# Patient Record
Sex: FEMALE | Race: WHITE | NOT HISPANIC OR LATINO | Employment: UNEMPLOYED | URBAN - METROPOLITAN AREA
[De-identification: names, ages, dates, MRNs, and addresses within clinical notes are randomized per-mention and may not be internally consistent; named-entity substitution may affect disease eponyms.]

---

## 2017-11-16 ENCOUNTER — OFFICE VISIT (OUTPATIENT)
Dept: URGENT CARE | Facility: CLINIC | Age: 22
End: 2017-11-16
Payer: COMMERCIAL

## 2017-11-16 VITALS
WEIGHT: 171 LBS | SYSTOLIC BLOOD PRESSURE: 112 MMHG | HEIGHT: 63 IN | BODY MASS INDEX: 30.3 KG/M2 | OXYGEN SATURATION: 98 % | TEMPERATURE: 98.3 F | DIASTOLIC BLOOD PRESSURE: 72 MMHG | HEART RATE: 103 BPM

## 2017-11-16 DIAGNOSIS — R10.11 ACUTE BILATERAL UPPER ABDOMINAL PAIN: ICD-10-CM

## 2017-11-16 DIAGNOSIS — K21.9 GASTROESOPHAGEAL REFLUX DISEASE, ESOPHAGITIS PRESENCE NOT SPECIFIED: ICD-10-CM

## 2017-11-16 DIAGNOSIS — Z3A.24 24 WEEKS GESTATION OF PREGNANCY: ICD-10-CM

## 2017-11-16 DIAGNOSIS — R10.12 ACUTE BILATERAL UPPER ABDOMINAL PAIN: ICD-10-CM

## 2017-11-16 DIAGNOSIS — R11.0 NAUSEA: ICD-10-CM

## 2017-11-16 LAB
APPEARANCE UR: ABNORMAL
BILIRUB UR STRIP-MCNC: ABNORMAL MG/DL
COLOR UR AUTO: YELLOW
FLUAV+FLUBV AG SPEC QL IA: NORMAL
GLUCOSE UR STRIP.AUTO-MCNC: ABNORMAL MG/DL
INT CON NEG: NEGATIVE
INT CON POS: POSITIVE
KETONES UR STRIP.AUTO-MCNC: ABNORMAL MG/DL
LEUKOCYTE ESTERASE UR QL STRIP.AUTO: ABNORMAL
NITRITE UR QL STRIP.AUTO: ABNORMAL
PH UR STRIP.AUTO: 8.5 [PH] (ref 5–8)
PROT UR QL STRIP: 30 MG/DL
RBC UR QL AUTO: ABNORMAL
SP GR UR STRIP.AUTO: 1
UROBILINOGEN UR STRIP-MCNC: ABNORMAL MG/DL

## 2017-11-16 PROCEDURE — 87804 INFLUENZA ASSAY W/OPTIC: CPT | Performed by: NURSE PRACTITIONER

## 2017-11-16 PROCEDURE — 99204 OFFICE O/P NEW MOD 45 MIN: CPT | Performed by: NURSE PRACTITIONER

## 2017-11-16 PROCEDURE — 81002 URINALYSIS NONAUTO W/O SCOPE: CPT | Performed by: NURSE PRACTITIONER

## 2017-11-16 RX ORDER — ACETAMINOPHEN 160 MG/5ML
15 SUSPENSION ORAL EVERY 4 HOURS PRN
Status: ON HOLD | COMMUNITY
End: 2018-03-06

## 2017-11-16 NOTE — PROGRESS NOTES
"Chief Complaint   Patient presents with   • GI Problem       HISTORY OF PRESENT ILLNESS: Patient is a 22 y.o. female who presents to urgent care today with complaints of nausea and upper abdominal pain. She is currently 24 weeks pregnant. States that since yesterday she has had intermittent upper abdominal cramping, the pain awoke her from sleep last night. States she has a history of acid reflux and took a tums with some improvement. She is concerned because of this pain along with \"contractions\" over the past several days. She has not noticed changes in fetal movement. Denies fever, chills, urinary complaints, flank pain, sore throat, diarrhea, or vomiting. She called her OBGYN today and they instructed her to come to urgent care today to get tested for the \"flu\".       There are no active problems to display for this patient.      Allergies:Patient has no allergy information on record.    Current Outpatient Prescriptions Ordered in [facility]   Medication Sig Dispense Refill   • acetaminophen (TYLENOL) 160 MG/5ML Suspension Take 15 mg/kg by mouth every four hours as needed.     • Prenatal Vit-Fe Fumarate-FA (PRENATAL VITAMIN PO) Take  by mouth.       No current Epic-ordered facility-administered medications on file.        History reviewed. No pertinent past medical history.    Social History   Substance Use Topics   • Smoking status: Former Smoker     Quit date: 5/16/2017   • Smokeless tobacco: Never Used   • Alcohol use Not on file       No family status information on file.   History reviewed. No pertinent family history.    ROS:  Review of Systems   Constitutional: Negative for fever, chills, weight loss, malaise, and fatigue.   HENT: Negative for ear pain, nosebleeds, congestion, sore throat and neck pain.    Eyes: Negative for vision changes.   Neuro: Negative for headache, sensory changes, weakness, seizure, LOC.   Cardiovascular: Negative for chest pain, palpitations, orthopnea and leg swelling.   Respiratory: " "Negative for cough, sputum production, shortness of breath and wheezing.   Gastrointestinal: Positive for abdominal pain, nausea. Negative for vomiting or diarrhea.   Genitourinary: Negative for dysuria, urgency and frequency.  Musculoskeletal: Negative for falls, neck pain, back pain, joint pain, myalgias.   Skin: Negative for rash, diaphoresis.     Exam:  Blood pressure 112/72, pulse (!) 103, temperature 36.8 °C (98.3 °F), height 1.6 m (5' 3\"), weight 77.6 kg (171 lb), SpO2 98 %, not currently breastfeeding.  General: well-nourished, well-developed female in NAD  Head: normocephalic, atraumatic  Eyes: PERRLA, no conjunctival injection, acuity grossly intact, lids normal.  Ears: normal shape and symmetry, gross auditory acuity is intact.  Nose: symmetrical without tenderness, no discharge.  Mouth/Throat: reasonable hygiene, no erythema, exudates or tonsillar enlargement.  Neck: no masses, range of motion within normal limits, no tracheal deviation. No obvious thyroid enlargement.   Lymph: no cervical adenopathy. No supraclavicular adenopathy.   Neuro: alert and oriented. Cranial nerves 1-12 grossly intact. No sensory deficit.   Cardiovascular: tachycardic rate and regular rhythm. No edema.  Pulmonary: no distress. Chest is symmetrical with respiration, no wheezes, crackles, or rhonchi.   Abdomen: soft, epigastric tenderness, no guarding, no hepatosplenomegaly.  Musculoskeletal: no clubbing, appropriate muscle tone, gait is stable.  Skin: warm, dry, intact, no clubbing, no cyanosis, no rashes.   Psych: appropriate mood, affect, judgement.         Assessment/Plan:  1. Acute bilateral upper abdominal pain     2. 24 weeks gestation of pregnancy     3. Nausea  POCT Influenza A/B    POCT Urinalysis   4. Gastroesophageal reflux disease, esophagitis presence not specified           POC flu negative    Lab Results   Component Value Date/Time    POCCOLOR yellow 11/16/2017 10:17 AM    POCAPPEAR Cloudy 11/16/2017 10:17 AM    " "POCLEUKEST tr 11/16/2017 10:17 AM    POCNITRITE neg 11/16/2017 10:17 AM    POCUROBILIGE neg 11/16/2017 10:17 AM    POCPROTEIN 30 11/16/2017 10:17 AM    POCURPH 8.5 (A) 11/16/2017 10:17 AM    POCBLOOD neg 11/16/2017 10:17 AM    POCSPGRV 1.005 11/16/2017 10:17 AM    POCKETONES neg 11/16/2017 10:17 AM    POCBILIRUBIN neg 11/16/2017 10:17 AM    POCGLUCUA neg 11/16/2017 10:17 AM        The patient is a 22-year-old female who is currently 24 weeks pregnant who presents today with bilateral, intermittent, upper abdominal pain, described as cramping, with associated nausea for the past 2 days. I do not suspect flu today and flu swab negative in office. Her urine dip is negative for obvious infectious process. Her pain may be related to reflux as Tums was beneficial relieving symptoms. Nevertheless, I'm concerned about her symptoms along with recent \"contractions\" and feel she needs further fetal monitoring and evaluation. The patient has been instructed to call and follow up with her OB/GYN today. If she is unable to do so, she's been encouraged to go to the labor and delivery floor for further evaluation and care. The patient states agreement and understanding.          Please note that this dictation was created using voice recognition software. I have made every reasonable attempt to correct obvious errors, but I expect that there are errors of grammar and possibly content that I did not discover before finalizing the note.      MELVIN Muñoz.     "

## 2017-11-16 NOTE — LETTER
November 16, 2017         Patient: Carrie Adame   YOB: 1995   Date of Visit: 11/16/2017           To Whom it May Concern:    Carrie Adame was seen in my clinic on 11/16/2017. She may be excused from work today and tomorrow.     If you have any questions or concerns, please don't hesitate to call.        Sincerely,           MELVIN Muñoz.  Electronically Signed

## 2017-11-21 ENCOUNTER — HOSPITAL ENCOUNTER (OUTPATIENT)
Facility: MEDICAL CENTER | Age: 22
End: 2017-11-21
Attending: OBSTETRICS & GYNECOLOGY | Admitting: OBSTETRICS & GYNECOLOGY
Payer: COMMERCIAL

## 2017-11-21 ENCOUNTER — OFFICE VISIT (OUTPATIENT)
Dept: URGENT CARE | Facility: CLINIC | Age: 22
End: 2017-11-21
Payer: COMMERCIAL

## 2017-11-21 VITALS
TEMPERATURE: 97.5 F | OXYGEN SATURATION: 97 % | HEIGHT: 63 IN | WEIGHT: 174 LBS | SYSTOLIC BLOOD PRESSURE: 112 MMHG | RESPIRATION RATE: 20 BRPM | DIASTOLIC BLOOD PRESSURE: 70 MMHG | BODY MASS INDEX: 30.83 KG/M2 | HEART RATE: 138 BPM

## 2017-11-21 VITALS — HEART RATE: 115 BPM | SYSTOLIC BLOOD PRESSURE: 126 MMHG | TEMPERATURE: 97.8 F | DIASTOLIC BLOOD PRESSURE: 66 MMHG

## 2017-11-21 DIAGNOSIS — R11.10 VOMITING, INTRACTABILITY OF VOMITING NOT SPECIFIED, PRESENCE OF NAUSEA NOT SPECIFIED, UNSPECIFIED VOMITING TYPE: ICD-10-CM

## 2017-11-21 DIAGNOSIS — Z3A.25 25 WEEKS GESTATION OF PREGNANCY: ICD-10-CM

## 2017-11-21 LAB
ALBUMIN SERPL BCP-MCNC: 3.6 G/DL (ref 3.2–4.9)
ALBUMIN/GLOB SERPL: 1.4 G/DL
ALP SERPL-CCNC: 70 U/L (ref 30–99)
ALT SERPL-CCNC: 37 U/L (ref 2–50)
ANION GAP SERPL CALC-SCNC: 10 MMOL/L (ref 0–11.9)
APPEARANCE UR: CLEAR
AST SERPL-CCNC: 36 U/L (ref 12–45)
BASOPHILS # BLD AUTO: 0.3 % (ref 0–1.8)
BASOPHILS # BLD: 0.02 K/UL (ref 0–0.12)
BILIRUB SERPL-MCNC: 1.1 MG/DL (ref 0.1–1.5)
BILIRUB UR STRIP-MCNC: NEGATIVE MG/DL
BUN SERPL-MCNC: 5 MG/DL (ref 8–22)
CALCIUM SERPL-MCNC: 9.3 MG/DL (ref 8.5–10.5)
CHLORIDE SERPL-SCNC: 107 MMOL/L (ref 96–112)
CO2 SERPL-SCNC: 19 MMOL/L (ref 20–33)
COLOR UR AUTO: NORMAL
CREAT SERPL-MCNC: 0.4 MG/DL (ref 0.5–1.4)
EOSINOPHIL # BLD AUTO: 0.03 K/UL (ref 0–0.51)
EOSINOPHIL NFR BLD: 0.4 % (ref 0–6.9)
ERYTHROCYTE [DISTWIDTH] IN BLOOD BY AUTOMATED COUNT: 40.3 FL (ref 35.9–50)
FLUAV RNA SPEC QL NAA+PROBE: NEGATIVE
FLUBV RNA SPEC QL NAA+PROBE: NEGATIVE
GFR SERPL CREATININE-BSD FRML MDRD: >60 ML/MIN/1.73 M 2
GLOBULIN SER CALC-MCNC: 2.6 G/DL (ref 1.9–3.5)
GLUCOSE BLD-MCNC: 106 MG/DL (ref 70–100)
GLUCOSE SERPL-MCNC: 84 MG/DL (ref 65–99)
GLUCOSE UR STRIP.AUTO-MCNC: NEGATIVE MG/DL
HCT VFR BLD AUTO: 31.9 % (ref 37–47)
HGB BLD-MCNC: 10.6 G/DL (ref 12–16)
IMM GRANULOCYTES # BLD AUTO: 0.14 K/UL (ref 0–0.11)
IMM GRANULOCYTES NFR BLD AUTO: 1.8 % (ref 0–0.9)
KETONES UR STRIP.AUTO-MCNC: 40 MG/DL
LEUKOCYTE ESTERASE UR QL STRIP.AUTO: NEGATIVE
LYMPHOCYTES # BLD AUTO: 1.07 K/UL (ref 1–4.8)
LYMPHOCYTES NFR BLD: 14 % (ref 22–41)
MCH RBC QN AUTO: 28.2 PG (ref 27–33)
MCHC RBC AUTO-ENTMCNC: 33.2 G/DL (ref 33.6–35)
MCV RBC AUTO: 84.8 FL (ref 81.4–97.8)
MONOCYTES # BLD AUTO: 0.49 K/UL (ref 0–0.85)
MONOCYTES NFR BLD AUTO: 6.4 % (ref 0–13.4)
NEUTROPHILS # BLD AUTO: 5.92 K/UL (ref 2–7.15)
NEUTROPHILS NFR BLD: 77.1 % (ref 44–72)
NITRITE UR QL STRIP.AUTO: NEGATIVE
NRBC # BLD AUTO: 0 K/UL
NRBC BLD AUTO-RTO: 0 /100 WBC
PH UR STRIP.AUTO: 6.5 [PH] (ref 5–8)
PLATELET # BLD AUTO: 228 K/UL (ref 164–446)
PMV BLD AUTO: 10 FL (ref 9–12.9)
POTASSIUM SERPL-SCNC: 3.4 MMOL/L (ref 3.6–5.5)
PROT SERPL-MCNC: 6.2 G/DL (ref 6–8.2)
PROT UR QL STRIP: NORMAL MG/DL
RBC # BLD AUTO: 3.76 M/UL (ref 4.2–5.4)
RBC UR QL AUTO: NEGATIVE
SODIUM SERPL-SCNC: 136 MMOL/L (ref 135–145)
SP GR UR STRIP.AUTO: 1.01
UROBILINOGEN UR STRIP-MCNC: NEGATIVE MG/DL
WBC # BLD AUTO: 7.7 K/UL (ref 4.8–10.8)

## 2017-11-21 PROCEDURE — 87502 INFLUENZA DNA AMP PROBE: CPT

## 2017-11-21 PROCEDURE — 81002 URINALYSIS NONAUTO W/O SCOPE: CPT

## 2017-11-21 PROCEDURE — 82962 GLUCOSE BLOOD TEST: CPT | Performed by: NURSE PRACTITIONER

## 2017-11-21 PROCEDURE — 80053 COMPREHEN METABOLIC PANEL: CPT

## 2017-11-21 PROCEDURE — 81002 URINALYSIS NONAUTO W/O SCOPE: CPT | Performed by: NURSE PRACTITIONER

## 2017-11-21 PROCEDURE — 36415 COLL VENOUS BLD VENIPUNCTURE: CPT

## 2017-11-21 PROCEDURE — 85025 COMPLETE CBC W/AUTO DIFF WBC: CPT

## 2017-11-21 PROCEDURE — 99213 OFFICE O/P EST LOW 20 MIN: CPT | Performed by: NURSE PRACTITIONER

## 2017-11-21 PROCEDURE — 87086 URINE CULTURE/COLONY COUNT: CPT

## 2017-11-21 ASSESSMENT — ENCOUNTER SYMPTOMS
SORE THROAT: 0
FEVER: 0
VOMITING: 1
CHILLS: 0
ABDOMINAL PAIN: 1
HEADACHES: 0
NAUSEA: 1
COUGH: 0
DIZZINESS: 1
MYALGIAS: 0

## 2017-11-21 NOTE — LETTER
November 21, 2017         Patient: Carrie Adame   YOB: 1995   Date of Visit: 11/21/2017           To Whom it May Concern:    Carrie Adame was seen in my clinic on 11/21/2017. Please excuse her for today 11/21/17 and tomorrow 11/22/17.    If you have any questions or concerns, please don't hesitate to call.        Sincerely,           Collins Jacob, A.P.N.  Electronically Signed

## 2017-11-22 LAB
APPEARANCE UR: CLEAR
COLOR UR AUTO: YELLOW
GLUCOSE UR QL STRIP.AUTO: NEGATIVE MG/DL
KETONES UR QL STRIP.AUTO: NEGATIVE MG/DL
LEUKOCYTE ESTERASE UR QL STRIP.AUTO: NEGATIVE
NITRITE UR QL STRIP.AUTO: NEGATIVE
PH UR STRIP.AUTO: 7 [PH]
PROT UR QL STRIP: NEGATIVE MG/DL
RBC UR QL AUTO: NEGATIVE
SP GR UR: <=1.005

## 2017-11-22 NOTE — PROGRESS NOTES
Subjective:      Carrie Adame is a 22 y.o. female who presents with Food Poisoning (25 weeks pregnant); Emesis; and Dizziness            HPI This is a new problem. 22 year old female with one day history of vomiting. She has had 3 episodes during night. She had some soup today which she has kept down. Denies fever, chills, diarrhea or headache. She is noted to be quite tachycardic here in clinic. She states epigastric pain as well with the vomiting. She was seen here 5 days ago with similar symptoms and advised to seek appt with OB. She has called them but they at this time are unconcerned. She denies vaginal bleeding, contractions. +fetal movement.  Patient has no known allergies.  Current Outpatient Prescriptions on File Prior to Visit   Medication Sig Dispense Refill   • acetaminophen (TYLENOL) 160 MG/5ML Suspension Take 15 mg/kg by mouth every four hours as needed.     • Prenatal Vit-Fe Fumarate-FA (PRENATAL VITAMIN PO) Take  by mouth.       No current facility-administered medications on file prior to visit.      Social History     Social History   • Marital status: Single     Spouse name: N/A   • Number of children: N/A   • Years of education: N/A     Occupational History   • Not on file.     Social History Main Topics   • Smoking status: Former Smoker     Quit date: 5/16/2017   • Smokeless tobacco: Never Used   • Alcohol use Not on file   • Drug use: Unknown   • Sexual activity: Not on file     Other Topics Concern   • Not on file     Social History Narrative   • No narrative on file     family history is not on file.      Review of Systems   Constitutional: Negative for chills, fever and malaise/fatigue.   HENT: Negative for congestion and sore throat.    Respiratory: Negative for cough.    Gastrointestinal: Positive for abdominal pain, nausea and vomiting.   Genitourinary: Negative.    Musculoskeletal: Negative for myalgias.   Neurological: Positive for dizziness. Negative for headaches.  "  Endo/Heme/Allergies: Negative.           Objective:     /70   Pulse (!) 138   Temp 36.4 °C (97.5 °F)   Resp 20   Ht 1.6 m (5' 3\")   Wt 78.9 kg (174 lb)   SpO2 97%   BMI 30.82 kg/m²      Physical Exam   Constitutional: She is oriented to person, place, and time. She appears well-developed and well-nourished. No distress.   Cardiovascular: Regular rhythm and normal heart sounds.  Tachycardia present.    No murmur heard.  Pulmonary/Chest: Effort normal and breath sounds normal. No respiratory distress.   Abdominal: Soft. Bowel sounds are normal. There is no tenderness. There is no CVA tenderness.   Musculoskeletal: Normal range of motion.   Moves all 4 extremities normally   Neurological: She is alert and oriented to person, place, and time.   Skin: Skin is warm and dry.   Psychiatric: She has a normal mood and affect. Her behavior is normal. Thought content normal.   Nursing note and vitals reviewed.              Assessment/Plan:     1. Vomiting, intractability of vomiting not specified, presence of nausea not specified, unspecified vomiting type  POCT Urinalysis    POCT Glucose   2. 25 weeks gestation of pregnancy       Moderate ketone/tachy at 138  She is advised to seek care at labor and delivery for further evaluation tonite.  Work note.  "

## 2017-11-22 NOTE — PROGRESS NOTES
EDC- 3/3/18, EGA- 25.3    - Pt arrived to L&D after being seen at urgent care around 1700.  Pt states she had nausea/vomiting last night (pt reports 3 episodes of emesis last night, none today) after eating some bad yogurt.  Pt also reports several people at work had nausea/vomiting so she is unsure if she has food poisoning or a virus.  Pt reports +FM, denies LOF or VB, reports slight cramping which she states has been normal for her during this pregnancy.  EFM/TOCO applied, VSS.  Urine sample obtained for POC UA with results negative for ketones, SG <=1.005.  Pt states she drank 3 large bottles of water this afternoon and has been able to keep food down, denies any nausea at this time.    -  Report to Dr. Cross via phone.  Orders for labs received.    - Report to Dr. Cross regarding lab results and FHR tracing reviewed.  Orders to send urine for culture and discharge pt home with instruction to stay hydrated, eat potassium rich foods, and return if nausea/vomiting returns.  Discussed hydration, potassium level and potassium rich foods,  labor precautions, kick counts, and reasons to return to L&D with pt and FOB, verbalize understanding.    - Pt discharged home ambulatory in stable condition with FOB at side.

## 2017-11-23 LAB
BACTERIA UR CULT: NORMAL
SIGNIFICANT IND 70042: NORMAL
SITE SITE: NORMAL
SOURCE SOURCE: NORMAL

## 2017-12-29 ENCOUNTER — HOSPITAL ENCOUNTER (EMERGENCY)
Facility: MEDICAL CENTER | Age: 22
End: 2017-12-30
Attending: EMERGENCY MEDICINE
Payer: COMMERCIAL

## 2017-12-29 ENCOUNTER — HOSPITAL ENCOUNTER (OUTPATIENT)
Facility: MEDICAL CENTER | Age: 22
End: 2017-12-29
Attending: OBSTETRICS & GYNECOLOGY | Admitting: OBSTETRICS & GYNECOLOGY
Payer: COMMERCIAL

## 2017-12-29 ENCOUNTER — RESOLUTE PROFESSIONAL BILLING HOSPITAL PROF FEE (OUTPATIENT)
Dept: HOSPITALIST | Facility: MEDICAL CENTER | Age: 22
End: 2017-12-29
Payer: COMMERCIAL

## 2017-12-29 ENCOUNTER — APPOINTMENT (OUTPATIENT)
Dept: RADIOLOGY | Facility: MEDICAL CENTER | Age: 22
End: 2017-12-29
Attending: EMERGENCY MEDICINE
Payer: COMMERCIAL

## 2017-12-29 VITALS
TEMPERATURE: 97.8 F | SYSTOLIC BLOOD PRESSURE: 114 MMHG | RESPIRATION RATE: 20 BRPM | BODY MASS INDEX: 32.78 KG/M2 | HEART RATE: 123 BPM | WEIGHT: 185 LBS | OXYGEN SATURATION: 97 % | HEIGHT: 63 IN | DIASTOLIC BLOOD PRESSURE: 60 MMHG

## 2017-12-29 DIAGNOSIS — R06.02 SHORTNESS OF BREATH: ICD-10-CM

## 2017-12-29 DIAGNOSIS — Z3A.31 31 WEEKS GESTATION OF PREGNANCY: ICD-10-CM

## 2017-12-29 DIAGNOSIS — R00.0 TACHYCARDIA: ICD-10-CM

## 2017-12-29 LAB
ALBUMIN SERPL BCP-MCNC: 3.9 G/DL (ref 3.2–4.9)
ALBUMIN/GLOB SERPL: 1.2 G/DL
ALP SERPL-CCNC: 93 U/L (ref 30–99)
ALT SERPL-CCNC: 11 U/L (ref 2–50)
ANION GAP SERPL CALC-SCNC: 12 MMOL/L (ref 0–11.9)
ANISOCYTOSIS BLD QL SMEAR: ABNORMAL
APPEARANCE UR: CLEAR
APPEARANCE UR: CLEAR
AST SERPL-CCNC: 10 U/L (ref 12–45)
BASOPHILS # BLD AUTO: 0 % (ref 0–1.8)
BASOPHILS # BLD: 0 K/UL (ref 0–0.12)
BILIRUB SERPL-MCNC: 0.7 MG/DL (ref 0.1–1.5)
BILIRUB UR QL STRIP.AUTO: NEGATIVE
BNP SERPL-MCNC: 14 PG/ML (ref 0–100)
BUN SERPL-MCNC: 6 MG/DL (ref 8–22)
CALCIUM SERPL-MCNC: 9.9 MG/DL (ref 8.5–10.5)
CHLORIDE SERPL-SCNC: 105 MMOL/L (ref 96–112)
CO2 SERPL-SCNC: 19 MMOL/L (ref 20–33)
COLOR UR AUTO: YELLOW
COLOR UR: YELLOW
CREAT SERPL-MCNC: 0.38 MG/DL (ref 0.5–1.4)
CULTURE IF INDICATED INDCX: NO UA CULTURE
DEPRECATED D DIMER PPP IA-ACNC: 419 NG/ML(D-DU)
EKG IMPRESSION: NORMAL
EOSINOPHIL # BLD AUTO: 0 K/UL (ref 0–0.51)
EOSINOPHIL NFR BLD: 0 % (ref 0–6.9)
ERYTHROCYTE [DISTWIDTH] IN BLOOD BY AUTOMATED COUNT: 39.6 FL (ref 35.9–50)
GFR SERPL CREATININE-BSD FRML MDRD: >60 ML/MIN/1.73 M 2
GLOBULIN SER CALC-MCNC: 3.3 G/DL (ref 1.9–3.5)
GLUCOSE SERPL-MCNC: 90 MG/DL (ref 65–99)
GLUCOSE UR QL STRIP.AUTO: 100 MG/DL
GLUCOSE UR STRIP.AUTO-MCNC: NEGATIVE MG/DL
HCT VFR BLD AUTO: 33.5 % (ref 37–47)
HGB BLD-MCNC: 11.2 G/DL (ref 12–16)
KETONES UR QL STRIP.AUTO: NEGATIVE MG/DL
KETONES UR STRIP.AUTO-MCNC: NEGATIVE MG/DL
LEUKOCYTE ESTERASE UR QL STRIP.AUTO: NEGATIVE
LEUKOCYTE ESTERASE UR QL STRIP.AUTO: NEGATIVE
LYMPHOCYTES # BLD AUTO: 2.44 K/UL (ref 1–4.8)
LYMPHOCYTES NFR BLD: 17.4 % (ref 22–41)
MAGNESIUM SERPL-MCNC: 1.8 MG/DL (ref 1.5–2.5)
MANUAL DIFF BLD: NORMAL
MCH RBC QN AUTO: 27.7 PG (ref 27–33)
MCHC RBC AUTO-ENTMCNC: 33.4 G/DL (ref 33.6–35)
MCV RBC AUTO: 82.9 FL (ref 81.4–97.8)
MICRO URNS: NORMAL
MICROCYTES BLD QL SMEAR: ABNORMAL
MONOCYTES # BLD AUTO: 0.6 K/UL (ref 0–0.85)
MONOCYTES NFR BLD AUTO: 4.3 % (ref 0–13.4)
MORPHOLOGY BLD-IMP: NORMAL
NEUTROPHILS # BLD AUTO: 10.96 K/UL (ref 2–7.15)
NEUTROPHILS NFR BLD: 78.3 % (ref 44–72)
NITRITE UR QL STRIP.AUTO: NEGATIVE
NITRITE UR QL STRIP.AUTO: NEGATIVE
NRBC # BLD AUTO: 0 K/UL
NRBC BLD-RTO: 0 /100 WBC
PH UR STRIP.AUTO: 6 [PH]
PH UR STRIP.AUTO: 6.5 [PH]
PLATELET # BLD AUTO: 274 K/UL (ref 164–446)
PLATELET BLD QL SMEAR: NORMAL
PMV BLD AUTO: 10.3 FL (ref 9–12.9)
POTASSIUM SERPL-SCNC: 3.5 MMOL/L (ref 3.6–5.5)
PROT SERPL-MCNC: 7.2 G/DL (ref 6–8.2)
PROT UR QL STRIP: NEGATIVE MG/DL
PROT UR QL STRIP: NEGATIVE MG/DL
RBC # BLD AUTO: 4.04 M/UL (ref 4.2–5.4)
RBC BLD AUTO: PRESENT
RBC UR QL AUTO: ABNORMAL
RBC UR QL AUTO: NEGATIVE
SODIUM SERPL-SCNC: 136 MMOL/L (ref 135–145)
SP GR UR STRIP.AUTO: 1.01
SP GR UR: <=1.005
T4 FREE SERPL-MCNC: 0.53 NG/DL (ref 0.53–1.43)
TROPONIN I SERPL-MCNC: <0.01 NG/ML (ref 0–0.04)
TSH SERPL DL<=0.005 MIU/L-ACNC: 0.62 UIU/ML (ref 0.38–5.33)
UROBILINOGEN UR STRIP.AUTO-MCNC: 0.2 MG/DL
WBC # BLD AUTO: 14 K/UL (ref 4.8–10.8)

## 2017-12-29 PROCEDURE — 99245 OFF/OP CONSLTJ NEW/EST HI 55: CPT | Performed by: INTERNAL MEDICINE

## 2017-12-29 PROCEDURE — 80053 COMPREHEN METABOLIC PANEL: CPT

## 2017-12-29 PROCEDURE — 83880 ASSAY OF NATRIURETIC PEPTIDE: CPT

## 2017-12-29 PROCEDURE — 93005 ELECTROCARDIOGRAM TRACING: CPT | Performed by: EMERGENCY MEDICINE

## 2017-12-29 PROCEDURE — 84439 ASSAY OF FREE THYROXINE: CPT

## 2017-12-29 PROCEDURE — 71275 CT ANGIOGRAPHY CHEST: CPT

## 2017-12-29 PROCEDURE — 81002 URINALYSIS NONAUTO W/O SCOPE: CPT

## 2017-12-29 PROCEDURE — 83735 ASSAY OF MAGNESIUM: CPT

## 2017-12-29 PROCEDURE — 85379 FIBRIN DEGRADATION QUANT: CPT

## 2017-12-29 PROCEDURE — 59025 FETAL NON-STRESS TEST: CPT | Performed by: OBSTETRICS & GYNECOLOGY

## 2017-12-29 PROCEDURE — 84443 ASSAY THYROID STIM HORMONE: CPT

## 2017-12-29 PROCEDURE — 99285 EMERGENCY DEPT VISIT HI MDM: CPT

## 2017-12-29 PROCEDURE — 700117 HCHG RX CONTRAST REV CODE 255: Performed by: EMERGENCY MEDICINE

## 2017-12-29 PROCEDURE — 700105 HCHG RX REV CODE 258: Performed by: EMERGENCY MEDICINE

## 2017-12-29 PROCEDURE — 81003 URINALYSIS AUTO W/O SCOPE: CPT

## 2017-12-29 PROCEDURE — 96360 HYDRATION IV INFUSION INIT: CPT

## 2017-12-29 PROCEDURE — 84484 ASSAY OF TROPONIN QUANT: CPT

## 2017-12-29 PROCEDURE — 93005 ELECTROCARDIOGRAM TRACING: CPT

## 2017-12-29 PROCEDURE — 85007 BL SMEAR W/DIFF WBC COUNT: CPT

## 2017-12-29 PROCEDURE — 96361 HYDRATE IV INFUSION ADD-ON: CPT

## 2017-12-29 PROCEDURE — 85027 COMPLETE CBC AUTOMATED: CPT

## 2017-12-29 RX ORDER — SODIUM CHLORIDE 9 MG/ML
1000 INJECTION, SOLUTION INTRAVENOUS ONCE
Status: COMPLETED | OUTPATIENT
Start: 2017-12-29 | End: 2017-12-29

## 2017-12-29 RX ADMIN — SODIUM CHLORIDE 1000 ML: 9 INJECTION, SOLUTION INTRAVENOUS at 17:08

## 2017-12-29 RX ADMIN — IOHEXOL 55 ML: 350 INJECTION, SOLUTION INTRAVENOUS at 21:54

## 2017-12-29 RX ADMIN — SODIUM CHLORIDE 1000 ML: 9 INJECTION, SOLUTION INTRAVENOUS at 21:00

## 2017-12-29 NOTE — ED NOTES
Carrie Gilliam Bachar  Chief Complaint   Patient presents with   • Tachycardia     pt sent from OB for further work up for tachycardia.    • Pregnancy     31 weeks     Pt to triage in w/c with above complaint. VSS.   Pt with hx of syncope 10 years ago and dx with arrhythmia,  No problems since. Taken for EKG, then if ok, Pt returned to lobby, educated on triage process, and to inform staff of any changes or concerns.

## 2017-12-29 NOTE — PROGRESS NOTES
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 Denies CP and SOB. Has hx of asthma but not currently having an attack. Last albuterol was 2 weeks ago. States she fainted at age 12 and was told she has a slight arrhythmia then. Quit smoking with pregnancy. Denies alcohol and other drugs. Denies other health problems. Reports + BH ctx. Denies lof and vb. Reports +FM (less this afternoon than earlier). Takes PNV and just finished Macrobid.     Pt drove self to hospital and walked up to unit.     To S230. TOCO/EFM applied. Audible and visible FM noted. Vitals as charted. Pulse 115-129. See POC urine. Glucose and trace blood noted. Pt denies dysuria. Had Frosted Flakes and sweet tea today. Now drinking water. Call to Dr. Osorio with above. FHT reactive a couple mild variables and FM. Orders received to transfer pt to ER. ER charge notified.  1345- RN escorted pt to ER in w/c.

## 2017-12-30 VITALS
DIASTOLIC BLOOD PRESSURE: 67 MMHG | SYSTOLIC BLOOD PRESSURE: 118 MMHG | WEIGHT: 185 LBS | OXYGEN SATURATION: 99 % | BODY MASS INDEX: 32.77 KG/M2 | TEMPERATURE: 98.6 F | HEART RATE: 115 BPM | RESPIRATION RATE: 18 BRPM

## 2017-12-30 PROBLEM — E87.6 HYPOKALEMIA: Status: ACTIVE | Noted: 2017-12-30

## 2017-12-30 PROBLEM — Z34.90 PREGNANCY: Status: ACTIVE | Noted: 2017-12-30

## 2017-12-30 PROBLEM — D64.9 ANEMIA: Status: ACTIVE | Noted: 2017-12-30

## 2017-12-30 PROBLEM — R00.0 TACHYCARDIA: Status: ACTIVE | Noted: 2017-12-30

## 2017-12-30 PROBLEM — D72.829 LEUKOCYTOSIS: Status: ACTIVE | Noted: 2017-12-30

## 2017-12-30 ASSESSMENT — PAIN SCALES - WONG BAKER: WONGBAKER_NUMERICALRESPONSE: DOESN'T HURT AT ALL

## 2017-12-30 NOTE — ED PROVIDER NOTES
CHIEF COMPLAINT  Chief Complaint   Patient presents with   • Tachycardia     pt sent from OB for further work up for tachycardia.    • Pregnancy     31 weeks       HPI  Carrie Adame is a 22 y.o. female who presents from OB for evaluation for an unexplained tachycardia for the past day.  OB doctor is Dr. Osorio who sent the patient here for evaluation. She states that she had some lightheadedness with mild shortness of breath symptoms. No chest pain..    Has a prior history of syncope when she was 12 years old however no other significant cardiac problems. Denies any obvious abnormalities with this pregnancy so far. Recently stopped taking Macrobid after suspected UTI. Denies recent nausea or vomiting. Denies chest pain or cough. No known sick contacts. No fevers. No bloody stool black stool.    REVIEW OF SYSTEMS  See HPI for further details. All other systems are negative.     PAST MEDICAL HISTORY   denies significant past medical history other than syncopal episode when she was younger    SOCIAL HISTORY  Social History     Social History Main Topics   • Smoking status: Former Smoker     Quit date: 5/16/2017   • Smokeless tobacco: Never Used   • Alcohol use Not on file   • Drug use: Unknown   • Sexual activity: Not on file       SURGICAL HISTORY  patient denies any surgical history    CURRENT MEDICATIONS  Home Medications    **Home medications have not yet been reviewed for this encounter**         ALLERGIES  No Known Allergies    PHYSICAL EXAM  VITAL SIGNS: /78   Pulse (!) 115   Temp 36.7 °C (98 °F) (Temporal)   Resp 18   Wt 83.9 kg (185 lb)   SpO2 97%   BMI 32.77 kg/m²   Pulse ox interpretation: I interpret this pulse ox as normal.  Constitutional: Alert in no apparent distress.  HENT: No signs of trauma, Bilateral external ears normal, Nose normal.   Eyes: Pupils are equal and reactive, Conjunctiva normal, Non-icteric.   Neck: Normal range of motion, No tenderness, Supple, No stridor.    Cardiovascular:Tachycardic rate and regular rhythm, no murmurs.   Thorax & Lungs: Normal breath sounds, No respiratory distress, No wheezing, No chest tenderness.   Abdomen: Gravid, No tenderness. No peritoneal signs.  Skin: Warm, Dry, No erythema, No rash.   Extremities: Intact distal pulses, No edema, No tenderness, No cyanosis  Neurologic: Alert , Normal motor function and gait, Normal sensory function, No focal deficits noted.       DIAGNOSTIC STUDIES / PROCEDURES    EKG  12/29/2017 at 2:18 PM  Sinus tachycardia 108  MO, QRS, QTC within normal limits  Normal axis  T wave inversions in lead 3 and aVF  No evidence of ST elevations    LABS  Labs Reviewed   CBC WITH DIFFERENTIAL - Abnormal; Notable for the following:        Result Value    WBC 14.0 (*)     RBC 4.04 (*)     Hemoglobin 11.2 (*)     Hematocrit 33.5 (*)     MCHC 33.4 (*)     Neutrophils-Polys 78.30 (*)     Lymphocytes 17.40 (*)     Neutrophils (Absolute) 10.96 (*)     All other components within normal limits   COMP METABOLIC PANEL - Abnormal; Notable for the following:     Potassium 3.5 (*)     Co2 19 (*)     Anion Gap 12.0 (*)     Bun 6 (*)     Creatinine 0.38 (*)     AST(SGOT) 10 (*)     All other components within normal limits   D-DIMER - Abnormal; Notable for the following:     D-Dimer Screen 419 (*)     All other components within normal limits   TSH   URINALYSIS,CULTURE IF INDICATED   TROPONIN   BTYPE NATRIURETIC PEPTIDE   MAGNESIUM   FREE THYROXINE   ESTIMATED GFR   DIFFERENTIAL MANUAL   PERIPHERAL SMEAR REVIEW   PLATELET ESTIMATE   MORPHOLOGY       RADIOLOGY  CT-CTA CHEST PULMONARY ARTERY W/ RECONS   Final Result         1.  Examination somewhat technically limited due to motion artifact and suboptimal contrast bolus technique, no large central pulmonary embolism is appreciated. Evaluation of the distal segmental and subsegmental branches is limited.   2.  Hepatomegaly and diffuse hepatic steatosis          Bedside ultrasound was performed.  Size of fetus consistent with dates of 31 weeks gestation. Fetal heart rate presents with a fetal heart rate of 150. Fetal movement identified.    COURSE & MEDICAL DECISION MAKING  Pertinent Labs & Imaging studies reviewed. (See chart for details)  22 y.o. female , estimated 31 weeks gestation, presenting with unexplained tachycardia and mild lightheadedness and very faint short of breath symptoms. Sent in by her OB physician for further management. Laboratory studies were performed initially with leukocytosis of uncertain significance. Patient is without fever. Clear breath sounds bilaterally. Likely secondary to a stress response. Has slight acidosis with a CO2 of 19.    Given the patient's tachycardia and slight metabolic acidosis, was given IV fluid hydration to see if she would response to this therapy. Had some improvement in her overall heart rate however continues to be tachycardic.    EKG was done upon arrival showing T-wave inversions in lead 3 and aVF. Unexplained abnormalities to the EKG. No old EKG for comparison.    Given the patient's persistent tachycardia vague symptoms of lightheadedness and shortness of breath along with her advanced gestational state, recommending CT pulmonary angiogram for further evaluation. This was ordered and consultation with on-call ObGyn, Dr. Peter. Risks and benefits of this procedure were discussed thoroughly with the patient at bedside. There is concern for possible pulmonary embolism given her constellation of symptoms and especially with elevated d-dimer.    CT pulmonary angiogram did not show any obvious acute abnormalities. Unclear etiology of the patient's tachycardia. No resolution with IV fluid hydration. Recommending further admission for workup of her EKG abnormalities and tachycardia and her symptoms. There is concern for possible underlying peripartum cardiomyopathy. Spoke with the hospitalist regarding admission and he agrees to admission. However,  upon evaluation of patient bedside, she has decided to sign out against medical advice rather than be admitted to the hospital for further workup.    Prolonged discussion was had with the patient regarding the risks and benefits of this decision. She was informed that cannot fully rule out a life-threatening condition at this time and that she is at some risk if she is significantly the hospital without complete medical evaluation. The patient reports understanding this clearly. Continues to want to leave against medical advice rather than be admitted to the hospital.    The patient ultimately signed out against medical advice. Patient was instructed however to return at any moment if she develops any other concerning signs or symptoms.    The patient will return for worsening symptoms or failure of improvement and is stable at the time of discharge. The patient verbalizes understanding in their own words.    /67   Pulse (!) 115   Temp 37 °C (98.6 °F)   Resp 18   Wt 83.9 kg (185 lb)   SpO2 99%   BMI 32.77 kg/m²     Pcp Pt States None          [facility], Emergency Dept  [facility] [facility] [address] [phone]    As needed, If symptoms worsen    Corinne E Capurro, M.D.  [address]  [address]  [facility] [address]  [phone]    In 1 day        FINAL IMPRESSION  1. Tachycardia    2. 31 weeks gestation of pregnancy    3. Shortness of breath            Electronically signed by: Rio Juarez, 12/29/2017 4:29 PM

## 2017-12-30 NOTE — CONSULTS
Hospital Medicine History and Physical    Date of Service  12/29/2017  Consulting request physician Dr. Ann FARAH    Reason for consultation admission for cardiac workup    Chief Complaint  Chief Complaint   Patient presents with   • Tachycardia     pt sent from OB for further work up for tachycardia.    • Pregnancy     31 weeks        History of Presenting Illness  22 y.o. female with a past medical history of chronic Tachycardia and pregnancy presented 12/29/2017 with complaint of tachycardia. Apparently patient currently is pregnancy at 31 weeks. Patient was seen by OB on the floor. Recommended to come to the hospital. Checkout for palpitation. Patient was noticed to have a heart rate 138 OB office. Sinus tach. Questionable history of arrhythmia. In the ER patient's EKG showing these 3 and aVF T-wave inversion. CT PE study and active. Recommend patient to be admitted to the hospital for evaluation of echocardiogram. Patient otherwise denies fever, chills, nausea, vomiting, adb pain, SOB, CP, headache, constipation, diarrhea, cough, or sputum.      Primary Care Physician  Pcp Pt States None    Consultants  Hospitalist    Code Status  Code: Full code    Review of Systems  ROS    per HPI otherwise 14 points reviewed 12 systems neg per AMA/CMS criteria.           Past Medical History  Palpitation  Questionable arrhythmia    Surgical History  Denies major a history of surgery    Medications  No current facility-administered medications on file prior to encounter.      Current Outpatient Prescriptions on File Prior to Encounter   Medication Sig Dispense Refill   • acetaminophen (TYLENOL) 160 MG/5ML Suspension Take 15 mg/kg by mouth every four hours as needed.     • Prenatal Vit-Fe Fumarate-FA (PRENATAL VITAMIN PO) Take  by mouth.         Family History  Patient's father has congenital heart disease  Otherwise reviewed and felt non pertinent to this encounter     Social History  Social History   Substance Use Topics   •  Smoking status: Former Smoker     Quit date: 5/16/2017   • Smokeless tobacco: Never Used   • Alcohol use Not on file       Allergies  No Known Allergies     Physical Exam  Laboratory   Vitals/ General Appearance:   Weight/BMI: Body mass index is 32.77 kg/m².  Blood pressure 127/69, pulse (!) 111, temperature 36.7 °C (98 °F), temperature source Temporal, resp. rate 18, weight 83.9 kg (185 lb), SpO2 97 %.   Vitals:    12/29/17 1346 12/29/17 1404 12/29/17 1902   BP: 132/78  127/69   Pulse: (!) 115  (!) 111   Resp: 18  18   Temp: 36.7 °C (98 °F)     TempSrc: Temporal     SpO2: 97%  97%   Weight:  83.9 kg (185 lb)     Oxygen Therapy:  Pulse Oximetry: 97 %, O2 Delivery: None (Room Air)    Constitutional:  well developed, well nourished, non-toxic, no acute distress  HENMT: Normocephalic, atraumatic, b/l ears normal, nose normal  Eyes:  EOMI, conjunctiva normal, no discharge  Neck: no tracheal deviation, supple  Cardiovascular: tachycardic, normal rhythm, no murmurs, no rubs or gallops; no cyanosis, clubbing or edema  Lungs: Respiratory effort is normal, normal breath sounds, breath sounds clear to auscultation b/l, no rales, rhonchi or wheezing  Abdomen: soft, non-tender, no guarding or rebound, active BS, no mass  Skin: warm, dry, no erythema, no rash  Neurologic: Alert and oriented, strength 5/5, no focal deficits, CN II-XII normal  Psychiatric: No anxiety or depression  Lymph node: No lymphadenopathy appreciated in the neck groin and axillary area.   Extremities: Bilateral lower extremities no pitting edema, bilateral pulses symmetric          Assessment/Plan  * Tachycardia- (present on admission)   Assessment & Plan    Unclear etiology, possibly due to anxiety versus pregnancy  CT PE study negative  EKG showing T-wave inversion  Recommend patient to be admitted for observation and echocardiogram in the morning however patient wants to leave against medical otherwise.  Patient currently safe and stable and  symptom-free. Patient's heart rate on physical examination is .        Hypokalemia- (present on admission)   Assessment & Plan    K 3.5  Need to be replaced        Anemia- (present on admission)   Assessment & Plan    Hgb 11  likley from pregnacy, no signs of bleeding        Leukocytosis- (present on admission)   Assessment & Plan    Wbc 14  No other signs of infection  ? Etiology  Need to follow up        Pregnancy- (present on admission)   Assessment & Plan    31 w  Stable  F/u with ob              Recent Labs      12/29/17   1700   WBC  14.0*   RBC  4.04*   HEMOGLOBIN  11.2*   HEMATOCRIT  33.5*   MCV  82.9   MCH  27.7   MCHC  33.4*   RDW  39.6   PLATELETCT  274   MPV  10.3     Recent Labs      12/29/17   1700   SODIUM  136   POTASSIUM  3.5*   CHLORIDE  105   CO2  19*   GLUCOSE  90   BUN  6*   CREATININE  0.38*   CALCIUM  9.9     Recent Labs      12/29/17   1700   ALTSGPT  11   ASTSGOT  10*   ALKPHOSPHAT  93   TBILIRUBIN  0.7   GLUCOSE  90         Recent Labs      12/29/17   1700   BNPBTYPENAT  14         Lab Results   Component Value Date    TROPONINI <0.01 12/29/2017       Imaging  CT-CTA CHEST PULMONARY ARTERY W/ RECONS   Final Result         1.  Examination somewhat technically limited due to motion artifact and suboptimal contrast bolus technique, no large central pulmonary embolism is appreciated. Evaluation of the distal segmental and subsegmental branches is limited.   2.  Hepatomegaly and diffuse hepatic steatosis             I have discussed patient admission status with  in the ER.    I spent 74 minutes, reviewing the chart, notes, vitals, labs, imaging, ordering labs, evaluating Ms. Adame for assessment, enacting the plan above. 50% of the time was spent in counseling Ms. Adame and answering questions. Medical decision making is therefore complex. Time was devoted to counseling and coordinating care including review of records, pertinent lab data and studies, as well as discussing  diagnostic evaluation and work up, planned therapeutic interventions and future disposition of care. Where indicated, the assessment and plan reflect discussion of patient with consultants, other healthcare providers, family members, and additional research needed to obtain further information in formulating the plan of care for Ms. Adame.       This dictation was created using voice recognition software. The accuracy of the dictation is limited to the abilities of the software. Although every efforts have been used to decrease the error, I expect there may be some errors of grammar and possibly content.

## 2017-12-30 NOTE — ED NOTES
"PT IS AAOX4, PT IS IN NAD, PT IS LEAVEING AMA. ER DM WANTED TO ADMIT PT TO THE HOSPITAL AND AS PER PT \" I WANT TO BE WITH MY FAMILY\" PT WAS TOLD OF RISK TO HERSELF AND HER BABY , BOTH SHE AND HER  STATED UNDERSTANDING. PT WAS GIVEN D/C INSTRUCTIONS AND SHE STATED UNDERSTANDING. PT LEFT WITH FAMILY   "

## 2017-12-30 NOTE — DISCHARGE INSTRUCTIONS
Nonspecific Tachycardia  Tachycardia is a faster than normal heartbeat (more than 100 beats per minute). In adults, the heart normally beats between 60 and 100 times a minute. A fast heartbeat may be a normal response to exercise or stress. It does not necessarily mean that something is wrong. However, sometimes when your heart beats too fast it may not be able to pump enough blood to the rest of your body. This can result in chest pain, shortness of breath, dizziness, and even fainting. Nonspecific tachycardia means that the specific cause or pattern of your tachycardia is unknown.  CAUSES   Tachycardia may be harmless or it may be due to a more serious underlying cause. Possible causes of tachycardia include:  · Exercise or exertion.  · Fever.  · Pain or injury.  · Infection.  · Loss of body fluids (dehydration).  · Overactive thyroid.  · Lack of red blood cells (anemia).  · Anxiety and stress.  · Alcohol.  · Caffeine.  · Tobacco products.  · Diet pills.  · Illegal drugs.  · Heart disease.  SYMPTOMS  · Rapid or irregular heartbeat (palpitations).  · Suddenly feeling your heart beating (cardiac awareness).  · Dizziness.  · Tiredness (fatigue).  · Shortness of breath.  · Chest pain.  · Nausea.  · Fainting.  DIAGNOSIS   Your caregiver will perform a physical exam and take your medical history. In some cases, a heart specialist (cardiologist) may be consulted. Your caregiver may also order:  · Blood tests.  · Electrocardiography. This test records the electrical activity of your heart.  · A heart monitoring test.  TREATMENT   Treatment will depend on the likely cause of your tachycardia. The goal is to treat the underlying cause of your tachycardia. Treatment methods may include:  · Replacement of fluids or blood through an intravenous (IV) tube for moderate to severe dehydration or anemia.  · New medicines or changes in your current medicines.  · Diet and lifestyle changes.  · Treatment for certain  infections.  · Stress relief or relaxation methods.  HOME CARE INSTRUCTIONS   · Rest.  · Drink enough fluids to keep your urine clear or pale yellow.  · Do not smoke.  · Avoid:  ¨ Caffeine.  ¨ Tobacco.  ¨ Alcohol.  ¨ Chocolate.  ¨ Stimulants such as over-the-counter diet pills or pills that help you stay awake.  ¨ Situations that cause anxiety or stress.  ¨ Illegal drugs such as marijuana, phencyclidine (PCP), and cocaine.  · Only take medicine as directed by your caregiver.  · Keep all follow-up appointments as directed by your caregiver.  SEEK IMMEDIATE MEDICAL CARE IF:   · You have pain in your chest, upper arms, jaw, or neck.  · You become weak, dizzy, or feel faint.  · You have palpitations that will not go away.  · You vomit, have diarrhea, or pass blood in your stool.  · Your skin is cool, pale, and wet.  · You have a fever that will not go away with rest, fluids, and medicine.  MAKE SURE YOU:   · Understand these instructions.  · Will watch your condition.  · Will get help right away if you are not doing well or get worse.     This information is not intended to replace advice given to you by your health care provider. Make sure you discuss any questions you have with your health care provider.     Document Released: 01/25/2006 Document Revised: 03/11/2013 Document Reviewed: 11/27/2012  [employer] Interactive Patient Education ©2016 [employer]

## 2017-12-30 NOTE — ASSESSMENT & PLAN NOTE
Unclear etiology, possibly due to anxiety versus pregnancy  CT PE study negative  EKG showing T-wave inversion  Recommend patient to be admitted for observation and echocardiogram in the morning however patient wants to leave against medical otherwise.  Patient currently safe and stable and symptom-free. Patient's heart rate on physical examination is .

## 2017-12-30 NOTE — PROGRESS NOTES
Explained the risks and benefits of the CT scan to the patient and the risks to her unborn child. Patient agreed to have the CT scan and understood all risks vs benefits. Consent signed by patient and ER physician.

## 2018-03-06 ENCOUNTER — HOSPITAL ENCOUNTER (INPATIENT)
Facility: MEDICAL CENTER | Age: 23
LOS: 5 days | End: 2018-03-11
Attending: OBSTETRICS & GYNECOLOGY | Admitting: OBSTETRICS & GYNECOLOGY
Payer: COMMERCIAL

## 2018-03-06 DIAGNOSIS — G89.18 POSTOPERATIVE PAIN: ICD-10-CM

## 2018-03-06 LAB
BASOPHILS # BLD AUTO: 0.3 % (ref 0–1.8)
BASOPHILS # BLD: 0.03 K/UL (ref 0–0.12)
EOSINOPHIL # BLD AUTO: 0.04 K/UL (ref 0–0.51)
EOSINOPHIL NFR BLD: 0.4 % (ref 0–6.9)
ERYTHROCYTE [DISTWIDTH] IN BLOOD BY AUTOMATED COUNT: 43.8 FL (ref 35.9–50)
HCT VFR BLD AUTO: 36.7 % (ref 37–47)
HGB BLD-MCNC: 12 G/DL (ref 12–16)
HOLDING TUBE BB 8507: NORMAL
IMM GRANULOCYTES # BLD AUTO: 0.09 K/UL (ref 0–0.11)
IMM GRANULOCYTES NFR BLD AUTO: 0.8 % (ref 0–0.9)
LYMPHOCYTES # BLD AUTO: 2.44 K/UL (ref 1–4.8)
LYMPHOCYTES NFR BLD: 21.7 % (ref 22–41)
MCH RBC QN AUTO: 26.4 PG (ref 27–33)
MCHC RBC AUTO-ENTMCNC: 32.7 G/DL (ref 33.6–35)
MCV RBC AUTO: 80.7 FL (ref 81.4–97.8)
MONOCYTES # BLD AUTO: 0.66 K/UL (ref 0–0.85)
MONOCYTES NFR BLD AUTO: 5.9 % (ref 0–13.4)
NEUTROPHILS # BLD AUTO: 7.99 K/UL (ref 2–7.15)
NEUTROPHILS NFR BLD: 70.9 % (ref 44–72)
NRBC # BLD AUTO: 0 K/UL
NRBC BLD-RTO: 0 /100 WBC
PLATELET # BLD AUTO: 275 K/UL (ref 164–446)
PMV BLD AUTO: 11.2 FL (ref 9–12.9)
RBC # BLD AUTO: 4.55 M/UL (ref 4.2–5.4)
WBC # BLD AUTO: 11.3 K/UL (ref 4.8–10.8)

## 2018-03-06 PROCEDURE — 700105 HCHG RX REV CODE 258

## 2018-03-06 PROCEDURE — 700102 HCHG RX REV CODE 250 W/ 637 OVERRIDE(OP): Performed by: OBSTETRICS & GYNECOLOGY

## 2018-03-06 PROCEDURE — 85025 COMPLETE CBC W/AUTO DIFF WBC: CPT

## 2018-03-06 PROCEDURE — 700111 HCHG RX REV CODE 636 W/ 250 OVERRIDE (IP): Performed by: OBSTETRICS & GYNECOLOGY

## 2018-03-06 PROCEDURE — 770002 HCHG ROOM/CARE - OB PRIVATE (112)

## 2018-03-06 PROCEDURE — A9270 NON-COVERED ITEM OR SERVICE: HCPCS | Performed by: OBSTETRICS & GYNECOLOGY

## 2018-03-06 RX ORDER — ONDANSETRON 2 MG/ML
4 INJECTION INTRAMUSCULAR; INTRAVENOUS EVERY 6 HOURS PRN
Status: DISCONTINUED | OUTPATIENT
Start: 2018-03-06 | End: 2018-03-07

## 2018-03-06 RX ORDER — IBUPROFEN 600 MG/1
600 TABLET ORAL EVERY 6 HOURS PRN
Status: DISCONTINUED | OUTPATIENT
Start: 2018-03-06 | End: 2018-03-07 | Stop reason: HOSPADM

## 2018-03-06 RX ORDER — SODIUM CHLORIDE, SODIUM LACTATE, POTASSIUM CHLORIDE, CALCIUM CHLORIDE 600; 310; 30; 20 MG/100ML; MG/100ML; MG/100ML; MG/100ML
INJECTION, SOLUTION INTRAVENOUS
Status: COMPLETED
Start: 2018-03-06 | End: 2018-03-06

## 2018-03-06 RX ORDER — OXYCODONE AND ACETAMINOPHEN 10; 325 MG/1; MG/1
1 TABLET ORAL EVERY 4 HOURS PRN
Status: DISCONTINUED | OUTPATIENT
Start: 2018-03-06 | End: 2018-03-07

## 2018-03-06 RX ORDER — OXYCODONE HYDROCHLORIDE AND ACETAMINOPHEN 5; 325 MG/1; MG/1
1 TABLET ORAL EVERY 4 HOURS PRN
Status: DISCONTINUED | OUTPATIENT
Start: 2018-03-06 | End: 2018-03-07

## 2018-03-06 RX ORDER — DEXTROSE, SODIUM CHLORIDE, SODIUM LACTATE, POTASSIUM CHLORIDE, AND CALCIUM CHLORIDE 5; .6; .31; .03; .02 G/100ML; G/100ML; G/100ML; G/100ML; G/100ML
INJECTION, SOLUTION INTRAVENOUS CONTINUOUS
Status: DISCONTINUED | OUTPATIENT
Start: 2018-03-07 | End: 2018-03-07

## 2018-03-06 RX ORDER — ONDANSETRON 4 MG/1
4 TABLET, ORALLY DISINTEGRATING ORAL EVERY 6 HOURS PRN
Status: DISCONTINUED | OUTPATIENT
Start: 2018-03-06 | End: 2018-03-07

## 2018-03-06 RX ORDER — AMPICILLIN 1 G/1
1 INJECTION, POWDER, FOR SOLUTION INTRAMUSCULAR; INTRAVENOUS EVERY 4 HOURS
Status: DISCONTINUED | OUTPATIENT
Start: 2018-03-07 | End: 2018-03-07

## 2018-03-06 RX ORDER — SODIUM CHLORIDE, SODIUM LACTATE, POTASSIUM CHLORIDE, CALCIUM CHLORIDE 600; 310; 30; 20 MG/100ML; MG/100ML; MG/100ML; MG/100ML
INJECTION, SOLUTION INTRAVENOUS CONTINUOUS
Status: DISPENSED | OUTPATIENT
Start: 2018-03-06 | End: 2018-03-07

## 2018-03-06 RX ORDER — AMPICILLIN 2 G/1
2 INJECTION, POWDER, FOR SOLUTION INTRAVENOUS ONCE
Status: COMPLETED | OUTPATIENT
Start: 2018-03-06 | End: 2018-03-06

## 2018-03-06 RX ADMIN — SODIUM CHLORIDE, POTASSIUM CHLORIDE, SODIUM LACTATE AND CALCIUM CHLORIDE 1000 ML: 600; 310; 30; 20 INJECTION, SOLUTION INTRAVENOUS at 21:05

## 2018-03-06 RX ADMIN — MISOPROSTOL 25 MCG: 100 TABLET ORAL at 22:22

## 2018-03-06 RX ADMIN — AMPICILLIN SODIUM 2 G: 2 INJECTION, POWDER, FOR SOLUTION INTRAMUSCULAR; INTRAVENOUS at 22:22

## 2018-03-06 ASSESSMENT — LIFESTYLE VARIABLES
DO YOU DRINK ALCOHOL: NO
ALCOHOL_USE: NO
EVER_SMOKED: NEVER

## 2018-03-06 ASSESSMENT — PATIENT HEALTH QUESTIONNAIRE - PHQ9
SUM OF ALL RESPONSES TO PHQ QUESTIONS 1-9: 0
2. FEELING DOWN, DEPRESSED, IRRITABLE, OR HOPELESS: NOT AT ALL
SUM OF ALL RESPONSES TO PHQ9 QUESTIONS 1 AND 2: 0
1. LITTLE INTEREST OR PLEASURE IN DOING THINGS: NOT AT ALL

## 2018-03-07 PROCEDURE — 700105 HCHG RX REV CODE 258: Performed by: OBSTETRICS & GYNECOLOGY

## 2018-03-07 PROCEDURE — 700102 HCHG RX REV CODE 250 W/ 637 OVERRIDE(OP): Performed by: OBSTETRICS & GYNECOLOGY

## 2018-03-07 PROCEDURE — 700111 HCHG RX REV CODE 636 W/ 250 OVERRIDE (IP): Performed by: OBSTETRICS & GYNECOLOGY

## 2018-03-07 PROCEDURE — 700102 HCHG RX REV CODE 250 W/ 637 OVERRIDE(OP)

## 2018-03-07 PROCEDURE — 700111 HCHG RX REV CODE 636 W/ 250 OVERRIDE (IP)

## 2018-03-07 PROCEDURE — 59514 CESAREAN DELIVERY ONLY: CPT

## 2018-03-07 PROCEDURE — 770002 HCHG ROOM/CARE - OB PRIVATE (112)

## 2018-03-07 PROCEDURE — 3E0P7VZ INTRODUCTION OF HORMONE INTO FEMALE REPRODUCTIVE, VIA NATURAL OR ARTIFICIAL OPENING: ICD-10-PCS | Performed by: OBSTETRICS & GYNECOLOGY

## 2018-03-07 PROCEDURE — 305385 HCHG SURGICAL SERVICES 1/4 HOUR: Performed by: OBSTETRICS & GYNECOLOGY

## 2018-03-07 PROCEDURE — 3E0234Z INTRODUCTION OF SERUM, TOXOID AND VACCINE INTO MUSCLE, PERCUTANEOUS APPROACH: ICD-10-PCS | Performed by: OBSTETRICS & GYNECOLOGY

## 2018-03-07 PROCEDURE — A9270 NON-COVERED ITEM OR SERVICE: HCPCS

## 2018-03-07 PROCEDURE — 304964 HCHG RECOVERY ROOM TIME 1HR: Performed by: OBSTETRICS & GYNECOLOGY

## 2018-03-07 PROCEDURE — 3E033VJ INTRODUCTION OF OTHER HORMONE INTO PERIPHERAL VEIN, PERCUTANEOUS APPROACH: ICD-10-PCS | Performed by: OBSTETRICS & GYNECOLOGY

## 2018-03-07 PROCEDURE — 10H07YZ INSERTION OF OTHER DEVICE INTO PRODUCTS OF CONCEPTION, VIA NATURAL OR ARTIFICIAL OPENING: ICD-10-PCS | Performed by: OBSTETRICS & GYNECOLOGY

## 2018-03-07 PROCEDURE — 700111 HCHG RX REV CODE 636 W/ 250 OVERRIDE (IP): Performed by: ANESTHESIOLOGY

## 2018-03-07 PROCEDURE — 306828 HCHG ANES-TIME GENERAL: Performed by: OBSTETRICS & GYNECOLOGY

## 2018-03-07 PROCEDURE — 36415 COLL VENOUS BLD VENIPUNCTURE: CPT

## 2018-03-07 PROCEDURE — 700101 HCHG RX REV CODE 250

## 2018-03-07 PROCEDURE — 10907ZC DRAINAGE OF AMNIOTIC FLUID, THERAPEUTIC FROM PRODUCTS OF CONCEPTION, VIA NATURAL OR ARTIFICIAL OPENING: ICD-10-PCS | Performed by: OBSTETRICS & GYNECOLOGY

## 2018-03-07 PROCEDURE — 4A1H74Z MONITORING OF PRODUCTS OF CONCEPTION, CARDIAC ELECTRICAL ACTIVITY, VIA NATURAL OR ARTIFICIAL OPENING: ICD-10-PCS | Performed by: OBSTETRICS & GYNECOLOGY

## 2018-03-07 PROCEDURE — 304966 HCHG RECOVERY SVSC TIME ADDL 1/2 HR: Performed by: OBSTETRICS & GYNECOLOGY

## 2018-03-07 PROCEDURE — A9270 NON-COVERED ITEM OR SERVICE: HCPCS | Performed by: OBSTETRICS & GYNECOLOGY

## 2018-03-07 PROCEDURE — 303615 HCHG EPIDURAL/SPINAL ANESTHESIA FOR LABOR

## 2018-03-07 PROCEDURE — 700105 HCHG RX REV CODE 258

## 2018-03-07 PROCEDURE — 700102 HCHG RX REV CODE 250 W/ 637 OVERRIDE(OP): Performed by: ANESTHESIOLOGY

## 2018-03-07 RX ORDER — SODIUM CHLORIDE, SODIUM LACTATE, POTASSIUM CHLORIDE, CALCIUM CHLORIDE 600; 310; 30; 20 MG/100ML; MG/100ML; MG/100ML; MG/100ML
1500 INJECTION, SOLUTION INTRAVENOUS ONCE
Status: COMPLETED | OUTPATIENT
Start: 2018-03-07 | End: 2018-03-07

## 2018-03-07 RX ORDER — DIPHENHYDRAMINE HYDROCHLORIDE 50 MG/ML
25 INJECTION INTRAMUSCULAR; INTRAVENOUS EVERY 6 HOURS PRN
Status: DISCONTINUED | OUTPATIENT
Start: 2018-03-07 | End: 2018-03-08

## 2018-03-07 RX ORDER — HYDROMORPHONE HYDROCHLORIDE 2 MG/ML
0.4 INJECTION, SOLUTION INTRAMUSCULAR; INTRAVENOUS; SUBCUTANEOUS
Status: DISCONTINUED | OUTPATIENT
Start: 2018-03-07 | End: 2018-03-08

## 2018-03-07 RX ORDER — KETOROLAC TROMETHAMINE 30 MG/ML
30 INJECTION, SOLUTION INTRAMUSCULAR; INTRAVENOUS EVERY 6 HOURS
Status: DISCONTINUED | OUTPATIENT
Start: 2018-03-08 | End: 2018-03-07

## 2018-03-07 RX ORDER — SODIUM CHLORIDE, SODIUM LACTATE, POTASSIUM CHLORIDE, CALCIUM CHLORIDE 600; 310; 30; 20 MG/100ML; MG/100ML; MG/100ML; MG/100ML
INJECTION, SOLUTION INTRAVENOUS PRN
Status: DISCONTINUED | OUTPATIENT
Start: 2018-03-07 | End: 2018-03-11 | Stop reason: HOSPADM

## 2018-03-07 RX ORDER — MEPERIDINE HYDROCHLORIDE 25 MG/ML
12.5 INJECTION INTRAMUSCULAR; INTRAVENOUS; SUBCUTANEOUS ONCE
Status: COMPLETED | OUTPATIENT
Start: 2018-03-07 | End: 2018-03-07

## 2018-03-07 RX ORDER — KETOROLAC TROMETHAMINE 30 MG/ML
30 INJECTION, SOLUTION INTRAMUSCULAR; INTRAVENOUS EVERY 6 HOURS
Status: DISCONTINUED | OUTPATIENT
Start: 2018-03-08 | End: 2018-03-08

## 2018-03-07 RX ORDER — OXYCODONE HYDROCHLORIDE AND ACETAMINOPHEN 5; 325 MG/1; MG/1
1 TABLET ORAL EVERY 4 HOURS PRN
Status: DISCONTINUED | OUTPATIENT
Start: 2018-03-07 | End: 2018-03-11 | Stop reason: HOSPADM

## 2018-03-07 RX ORDER — SODIUM CHLORIDE, SODIUM LACTATE, POTASSIUM CHLORIDE, CALCIUM CHLORIDE 600; 310; 30; 20 MG/100ML; MG/100ML; MG/100ML; MG/100ML
INJECTION, SOLUTION INTRAVENOUS
Status: COMPLETED
Start: 2018-03-07 | End: 2018-03-07

## 2018-03-07 RX ORDER — CITRIC ACID/SODIUM CITRATE 334-500MG
30 SOLUTION, ORAL ORAL ONCE
Status: COMPLETED | OUTPATIENT
Start: 2018-03-07 | End: 2018-03-07

## 2018-03-07 RX ORDER — ONDANSETRON 4 MG/1
4 TABLET, ORALLY DISINTEGRATING ORAL EVERY 6 HOURS PRN
Status: DISCONTINUED | OUTPATIENT
Start: 2018-03-07 | End: 2018-03-11 | Stop reason: HOSPADM

## 2018-03-07 RX ORDER — SIMETHICONE 80 MG
80 TABLET,CHEWABLE ORAL 4 TIMES DAILY PRN
Status: DISCONTINUED | OUTPATIENT
Start: 2018-03-07 | End: 2018-03-11 | Stop reason: HOSPADM

## 2018-03-07 RX ORDER — OXYCODONE AND ACETAMINOPHEN 10; 325 MG/1; MG/1
1 TABLET ORAL EVERY 4 HOURS PRN
Status: DISCONTINUED | OUTPATIENT
Start: 2018-03-07 | End: 2018-03-11 | Stop reason: HOSPADM

## 2018-03-07 RX ORDER — SODIUM CHLORIDE, SODIUM LACTATE, POTASSIUM CHLORIDE, CALCIUM CHLORIDE 600; 310; 30; 20 MG/100ML; MG/100ML; MG/100ML; MG/100ML
INJECTION, SOLUTION INTRAVENOUS
Status: DISCONTINUED
Start: 2018-03-07 | End: 2018-03-07

## 2018-03-07 RX ORDER — METOCLOPRAMIDE HYDROCHLORIDE 5 MG/ML
10 INJECTION INTRAMUSCULAR; INTRAVENOUS ONCE
Status: COMPLETED | OUTPATIENT
Start: 2018-03-07 | End: 2018-03-07

## 2018-03-07 RX ORDER — OXYCODONE AND ACETAMINOPHEN 10; 325 MG/1; MG/1
1 TABLET ORAL EVERY 4 HOURS PRN
Status: DISCONTINUED | OUTPATIENT
Start: 2018-03-07 | End: 2018-03-07

## 2018-03-07 RX ORDER — ONDANSETRON 2 MG/ML
4 INJECTION INTRAMUSCULAR; INTRAVENOUS EVERY 6 HOURS PRN
Status: DISCONTINUED | OUTPATIENT
Start: 2018-03-07 | End: 2018-03-11 | Stop reason: HOSPADM

## 2018-03-07 RX ORDER — HYDROMORPHONE HYDROCHLORIDE 2 MG/ML
0.2 INJECTION, SOLUTION INTRAMUSCULAR; INTRAVENOUS; SUBCUTANEOUS
Status: DISCONTINUED | OUTPATIENT
Start: 2018-03-07 | End: 2018-03-08

## 2018-03-07 RX ORDER — MISOPROSTOL 200 UG/1
600 TABLET ORAL
Status: COMPLETED | OUTPATIENT
Start: 2018-03-07 | End: 2018-03-07

## 2018-03-07 RX ORDER — METOCLOPRAMIDE HYDROCHLORIDE 5 MG/ML
10 INJECTION INTRAMUSCULAR; INTRAVENOUS EVERY 6 HOURS PRN
Status: DISCONTINUED | OUTPATIENT
Start: 2018-03-07 | End: 2018-03-08

## 2018-03-07 RX ORDER — DIPHENHYDRAMINE HYDROCHLORIDE 50 MG/ML
12.5 INJECTION INTRAMUSCULAR; INTRAVENOUS EVERY 6 HOURS PRN
Status: DISCONTINUED | OUTPATIENT
Start: 2018-03-07 | End: 2018-03-08

## 2018-03-07 RX ORDER — DOCUSATE SODIUM 100 MG/1
100 CAPSULE, LIQUID FILLED ORAL 2 TIMES DAILY PRN
Status: DISCONTINUED | OUTPATIENT
Start: 2018-03-07 | End: 2018-03-11 | Stop reason: HOSPADM

## 2018-03-07 RX ORDER — ACETAMINOPHEN 325 MG/1
325 TABLET ORAL EVERY 4 HOURS PRN
Status: DISCONTINUED | OUTPATIENT
Start: 2018-03-07 | End: 2018-03-11 | Stop reason: HOSPADM

## 2018-03-07 RX ORDER — OXYCODONE HYDROCHLORIDE AND ACETAMINOPHEN 5; 325 MG/1; MG/1
TABLET ORAL
Status: COMPLETED
Start: 2018-03-07 | End: 2018-03-07

## 2018-03-07 RX ORDER — IBUPROFEN 600 MG/1
600 TABLET ORAL EVERY 6 HOURS PRN
Status: DISCONTINUED | OUTPATIENT
Start: 2018-03-07 | End: 2018-03-11 | Stop reason: HOSPADM

## 2018-03-07 RX ORDER — OXYCODONE HYDROCHLORIDE AND ACETAMINOPHEN 5; 325 MG/1; MG/1
1 TABLET ORAL EVERY 4 HOURS PRN
Status: DISCONTINUED | OUTPATIENT
Start: 2018-03-07 | End: 2018-03-07

## 2018-03-07 RX ORDER — ONDANSETRON 2 MG/ML
4 INJECTION INTRAMUSCULAR; INTRAVENOUS EVERY 6 HOURS PRN
Status: DISCONTINUED | OUTPATIENT
Start: 2018-03-07 | End: 2018-03-07

## 2018-03-07 RX ORDER — ROPIVACAINE HYDROCHLORIDE 2 MG/ML
INJECTION, SOLUTION EPIDURAL; INFILTRATION; PERINEURAL
Status: COMPLETED
Start: 2018-03-07 | End: 2018-03-07

## 2018-03-07 RX ORDER — MEPERIDINE HYDROCHLORIDE 25 MG/ML
INJECTION INTRAMUSCULAR; INTRAVENOUS; SUBCUTANEOUS
Status: COMPLETED
Start: 2018-03-07 | End: 2018-03-07

## 2018-03-07 RX ADMIN — SODIUM CHLORIDE, POTASSIUM CHLORIDE, SODIUM LACTATE AND CALCIUM CHLORIDE 1500 ML: 600; 310; 30; 20 INJECTION, SOLUTION INTRAVENOUS at 17:34

## 2018-03-07 RX ADMIN — SODIUM CHLORIDE, POTASSIUM CHLORIDE, SODIUM LACTATE AND CALCIUM CHLORIDE: 600; 310; 30; 20 INJECTION, SOLUTION INTRAVENOUS at 08:38

## 2018-03-07 RX ADMIN — MISOPROSTOL 600 MCG: 200 TABLET ORAL at 21:06

## 2018-03-07 RX ADMIN — AMPICILLIN SODIUM 1 G: 1 INJECTION, POWDER, FOR SOLUTION INTRAMUSCULAR; INTRAVENOUS at 02:55

## 2018-03-07 RX ADMIN — SODIUM CHLORIDE, SODIUM LACTATE, POTASSIUM CHLORIDE, CALCIUM CHLORIDE AND DEXTROSE MONOHYDRATE: 5; 600; 310; 30; 20 INJECTION, SOLUTION INTRAVENOUS at 10:30

## 2018-03-07 RX ADMIN — AMPICILLIN SODIUM 1 G: 1 INJECTION, POWDER, FOR SOLUTION INTRAMUSCULAR; INTRAVENOUS at 07:13

## 2018-03-07 RX ADMIN — SODIUM CHLORIDE, SODIUM LACTATE, POTASSIUM CHLORIDE, CALCIUM CHLORIDE 1500 ML: 600; 310; 30; 20 INJECTION, SOLUTION INTRAVENOUS at 17:34

## 2018-03-07 RX ADMIN — MEPERIDINE HYDROCHLORIDE 12.5 MG: 25 INJECTION INTRAMUSCULAR; INTRAVENOUS; SUBCUTANEOUS at 20:16

## 2018-03-07 RX ADMIN — Medication 2 MILLI-UNITS/MIN: at 20:16

## 2018-03-07 RX ADMIN — SODIUM CHLORIDE, POTASSIUM CHLORIDE, SODIUM LACTATE AND CALCIUM CHLORIDE: 600; 310; 30; 20 INJECTION, SOLUTION INTRAVENOUS at 04:11

## 2018-03-07 RX ADMIN — METOCLOPRAMIDE 10 MG: 5 INJECTION, SOLUTION INTRAMUSCULAR; INTRAVENOUS at 17:42

## 2018-03-07 RX ADMIN — AMPICILLIN SODIUM 1 G: 1 INJECTION, POWDER, FOR SOLUTION INTRAMUSCULAR; INTRAVENOUS at 10:55

## 2018-03-07 RX ADMIN — FAMOTIDINE 20 MG: 10 INJECTION INTRAVENOUS at 17:41

## 2018-03-07 RX ADMIN — Medication 2 MILLI-UNITS/MIN: at 11:57

## 2018-03-07 RX ADMIN — OXYCODONE AND ACETAMINOPHEN 1 TABLET: 5; 325 TABLET ORAL at 22:11

## 2018-03-07 RX ADMIN — AMPICILLIN SODIUM 1 G: 1 INJECTION, POWDER, FOR SOLUTION INTRAMUSCULAR; INTRAVENOUS at 14:47

## 2018-03-07 RX ADMIN — SODIUM CITRATE AND CITRIC ACID MONOHYDRATE 30 ML: 500; 334 SOLUTION ORAL at 17:42

## 2018-03-07 RX ADMIN — ROPIVACAINE HYDROCHLORIDE 100 ML: 2 INJECTION, SOLUTION EPIDURAL; INFILTRATION; PERINEURAL at 10:06

## 2018-03-07 ASSESSMENT — PATIENT HEALTH QUESTIONNAIRE - PHQ9
1. LITTLE INTEREST OR PLEASURE IN DOING THINGS: NOT AT ALL
SUM OF ALL RESPONSES TO PHQ9 QUESTIONS 1 AND 2: 0
SUM OF ALL RESPONSES TO PHQ9 QUESTIONS 1 AND 2: 0
SUM OF ALL RESPONSES TO PHQ QUESTIONS 1-9: 0
1. LITTLE INTEREST OR PLEASURE IN DOING THINGS: NOT AT ALL
2. FEELING DOWN, DEPRESSED, IRRITABLE, OR HOPELESS: NOT AT ALL
2. FEELING DOWN, DEPRESSED, IRRITABLE, OR HOPELESS: NOT AT ALL
SUM OF ALL RESPONSES TO PHQ QUESTIONS 1-9: 0

## 2018-03-07 ASSESSMENT — PAIN SCALES - GENERAL: PAINLEVEL_OUTOF10: 4

## 2018-03-07 ASSESSMENT — COPD QUESTIONNAIRES
DO YOU EVER COUGH UP ANY MUCUS OR PHLEGM?: NO/ONLY WITH OCCASIONAL COLDS OR INFECTIONS
HAVE YOU SMOKED AT LEAST 100 CIGARETTES IN YOUR ENTIRE LIFE: NO/DON'T KNOW
COPD SCREENING SCORE: 0
DURING THE PAST 4 WEEKS HOW MUCH DID YOU FEEL SHORT OF BREATH: NONE/LITTLE OF THE TIME

## 2018-03-07 ASSESSMENT — LIFESTYLE VARIABLES: DO YOU DRINK ALCOHOL: NO

## 2018-03-07 NOTE — PROGRESS NOTES
S) pt comfortable with epidural  O) vss  VE: 5/80/-2  Fht's: 150's reactive  [place]: q4-5min  A/P IUP term  Continue pit

## 2018-03-07 NOTE — CARE PLAN
Problem: Infection  Goal: Will remain free from infection    Intervention: Give CDC handouts for infection prevention (infection prevention/hand washing, disease specific, and device specific) and document in Education  Hand hygiene reviewed with pt and visitors        Problem: Knowledge Deficit  Goal: Patient/Support person demonstrates understanding regarding the progression of labor, available options and participates in decision-making process    Intervention: Assess patient's preparation, knowledge level, and expectations/birth plan  Induction explained to patient and family. Monitors and POC reviewed. Questions answered.

## 2018-03-07 NOTE — PROGRESS NOTES
Report received from Norma Duncan. POC discussed, care assumed.  0730: SVE: 3/60/-2  0817:Dr. Osorio at bedside, SVE: 4/70/-2  0950: Epidural being placed   1150: Dr. Osorio called and updated regarding pt status, pt contractions spreading out, orders to start pitocin at this time. Pt had two late decels and temperature increasing.   1237: Dr. Osorio at bedside: SVE: 5/80/-2, reviewed strip   1502: SVE:  6/100/-2 by Gina DUNCAN   1638: Dr. Osorio at bedside SVE: 5/80/-2, per Dr. Osorio will continue pitocin for 2 hours, if no change primary c/s for delivery   1735: c/s called by Dr. Osorio, pt prepped for c/s.    1645: report given to Maude Duncan.

## 2018-03-07 NOTE — H&P
DATE OF ADMISSION:  2018    CHIEF COMPLAINT:  Induction of labor.    HISTORY OF PRESENT ILLNESS:  The patient is a 22-year-old female,  1,   para 0 at 40 and 4/7th weeks gestational age.  She presented last night to   undergo postdates induction of labor.  She received Cytotec overnight.  Her   pregnancy has been uncomplicated.    PAST MEDICAL HISTORY:  Negative.    PAST SURGICAL HISTORY:  Negative.    SOCIAL HISTORY:  Negative.    ALLERGIES:  PATIENT HAS AN ALLERGY TO PENICILLIN.    PHYSICAL EXAMINATION:  VITAL SIGNS:  Stable on admission.  CARDIOVASCULAR:  Regular rate and rhythm without murmurs.  LUNGS:  Clear.  ABDOMEN:  Gravid.  VAGINAL:  At this point now is 4, 80 and -2.  Fetal heart tones are 150s and   reactive and she is seth every 2 minutes.    ASSESSMENT AND PLAN:  This is an intrauterine pregnancy at 40 and 4/7th weeks.    Patient was admitted last night for postdates induction of labor.  She   received, I believe, 2 doses of Cytotec.  I just performed artificial rupture   of membranes and placed an IUPC and an FSE.  She is seth on her own,   but Pitocin will be started if her contractions slow down.  She knows that she   can have an epidural at any time point.       ____________________________________     Corinne E. Capurro, MD [facility] / [facility]    DD:  2018 08:28:32  DT:  2018 08:50:06    D#:  [id number]  Job#:  [id number]

## 2018-03-07 NOTE — PROGRESS NOTES
EDC 3/3 -40.3    -pt here from home for elective IOL. Pt denies any contractions, leaking or bleeding. Pt reports having some cramping here and there but nothing strong or consistent. Pt placed on monitors (us and toco) SVE 1/thick/high. Orders reviewed with pt. Questions answered regarding cytotec IOL. Pt denies any other questions. Family at bedside.   -Dr Bañuelos notified of patients arrival  -IV started, labs drawn. Admit completed  -Cytotec placed per orders. Pt positioned for comfort and denies any needs at this time  -Pt asked if she can walk around, Dicussed with Dr Bañuelos. Intermittent FM order received for times of walking.  -Pt changed her mind, does not want to walk, she is going to try to rest  0255-SVE 3/50/-2. POC dicussed with pt. Pt states understanding and encouraged to rest  0700-Report given to Donovan ÁLVAREZ

## 2018-03-08 LAB
ERYTHROCYTE [DISTWIDTH] IN BLOOD BY AUTOMATED COUNT: 43.5 FL (ref 35.9–50)
HCT VFR BLD AUTO: 28.9 % (ref 37–47)
HGB BLD-MCNC: 9.4 G/DL (ref 12–16)
MCH RBC QN AUTO: 25.8 PG (ref 27–33)
MCHC RBC AUTO-ENTMCNC: 32.5 G/DL (ref 33.6–35)
MCV RBC AUTO: 79.4 FL (ref 81.4–97.8)
PLATELET # BLD AUTO: 250 K/UL (ref 164–446)
PMV BLD AUTO: 11 FL (ref 9–12.9)
RBC # BLD AUTO: 3.64 M/UL (ref 4.2–5.4)
WBC # BLD AUTO: 15.3 K/UL (ref 4.8–10.8)

## 2018-03-08 PROCEDURE — 90471 IMMUNIZATION ADMIN: CPT

## 2018-03-08 PROCEDURE — 770002 HCHG ROOM/CARE - OB PRIVATE (112)

## 2018-03-08 PROCEDURE — 85027 COMPLETE CBC AUTOMATED: CPT

## 2018-03-08 PROCEDURE — 90715 TDAP VACCINE 7 YRS/> IM: CPT

## 2018-03-08 PROCEDURE — 700112 HCHG RX REV CODE 229

## 2018-03-08 PROCEDURE — 700111 HCHG RX REV CODE 636 W/ 250 OVERRIDE (IP): Performed by: ANESTHESIOLOGY

## 2018-03-08 PROCEDURE — 700111 HCHG RX REV CODE 636 W/ 250 OVERRIDE (IP): Performed by: OBSTETRICS & GYNECOLOGY

## 2018-03-08 PROCEDURE — 700102 HCHG RX REV CODE 250 W/ 637 OVERRIDE(OP): Performed by: OBSTETRICS & GYNECOLOGY

## 2018-03-08 PROCEDURE — A9270 NON-COVERED ITEM OR SERVICE: HCPCS | Performed by: OBSTETRICS & GYNECOLOGY

## 2018-03-08 PROCEDURE — 36415 COLL VENOUS BLD VENIPUNCTURE: CPT

## 2018-03-08 PROCEDURE — 700112 HCHG RX REV CODE 229: Performed by: OBSTETRICS & GYNECOLOGY

## 2018-03-08 RX ORDER — MISOPROSTOL 200 UG/1
600 TABLET ORAL
Status: DISCONTINUED | OUTPATIENT
Start: 2018-03-08 | End: 2018-03-11 | Stop reason: HOSPADM

## 2018-03-08 RX ORDER — SODIUM CHLORIDE, SODIUM LACTATE, POTASSIUM CHLORIDE, CALCIUM CHLORIDE 600; 310; 30; 20 MG/100ML; MG/100ML; MG/100ML; MG/100ML
INJECTION, SOLUTION INTRAVENOUS PRN
Status: DISCONTINUED | OUTPATIENT
Start: 2018-03-08 | End: 2018-03-11 | Stop reason: HOSPADM

## 2018-03-08 RX ORDER — DOCUSATE SODIUM 100 MG/1
100 CAPSULE, LIQUID FILLED ORAL 2 TIMES DAILY PRN
Status: DISCONTINUED | OUTPATIENT
Start: 2018-03-08 | End: 2018-03-11 | Stop reason: HOSPADM

## 2018-03-08 RX ADMIN — ONDANSETRON 4 MG: 4 TABLET, ORALLY DISINTEGRATING ORAL at 17:35

## 2018-03-08 RX ADMIN — OXYCODONE HYDROCHLORIDE AND ACETAMINOPHEN 1 TABLET: 10; 325 TABLET ORAL at 21:42

## 2018-03-08 RX ADMIN — SIMETHICONE CHEW TAB 80 MG 80 MG: 80 TABLET ORAL at 23:39

## 2018-03-08 RX ADMIN — DOCUSATE SODIUM 100 MG: 100 CAPSULE ORAL at 17:35

## 2018-03-08 RX ADMIN — KETOROLAC TROMETHAMINE 30 MG: 30 INJECTION, SOLUTION INTRAMUSCULAR at 16:08

## 2018-03-08 RX ADMIN — KETOROLAC TROMETHAMINE 30 MG: 30 INJECTION, SOLUTION INTRAMUSCULAR at 09:05

## 2018-03-08 RX ADMIN — OXYCODONE HYDROCHLORIDE AND ACETAMINOPHEN 1 TABLET: 5; 325 TABLET ORAL at 13:35

## 2018-03-08 RX ADMIN — KETOROLAC TROMETHAMINE 30 MG: 30 INJECTION, SOLUTION INTRAMUSCULAR at 01:37

## 2018-03-08 RX ADMIN — TETANUS TOXOID, REDUCED DIPHTHERIA TOXOID AND ACELLULAR PERTUSSIS VACCINE, ADSORBED 0.5 ML: 5; 2.5; 8; 8; 2.5 SUSPENSION INTRAMUSCULAR at 18:28

## 2018-03-08 RX ADMIN — OXYCODONE HYDROCHLORIDE AND ACETAMINOPHEN 1 TABLET: 5; 325 TABLET ORAL at 17:36

## 2018-03-08 RX ADMIN — ONDANSETRON 4 MG: 4 TABLET, ORALLY DISINTEGRATING ORAL at 23:39

## 2018-03-08 RX ADMIN — IBUPROFEN 600 MG: 600 TABLET, FILM COATED ORAL at 22:17

## 2018-03-08 RX ADMIN — SIMETHICONE CHEW TAB 80 MG 80 MG: 80 TABLET ORAL at 17:35

## 2018-03-08 ASSESSMENT — PAIN SCALES - GENERAL
PAINLEVEL_OUTOF10: 4
PAINLEVEL_OUTOF10: 1
PAINLEVEL_OUTOF10: 3
PAINLEVEL_OUTOF10: 2
PAINLEVEL_OUTOF10: 4
PAINLEVEL_OUTOF10: 7
PAINLEVEL_OUTOF10: 3
PAINLEVEL_OUTOF10: 5
PAINLEVEL_OUTOF10: 4
PAINLEVEL_OUTOF10: 3
PAINLEVEL_OUTOF10: 2
PAINLEVEL_OUTOF10: 3
PAINLEVEL_OUTOF10: 4
PAINLEVEL_OUTOF10: 1

## 2018-03-08 ASSESSMENT — LIFESTYLE VARIABLES
DO YOU DRINK ALCOHOL: NO
EVER_SMOKED: YES

## 2018-03-08 NOTE — CARE PLAN
Problem: Pain  Goal: Alleviation of Pain or a reduction in pain to the patient's comfort goal  Outcome: PROGRESSING AS EXPECTED  Patient resting comfortably, states pain well managed, will let Rn know of any changes.    Problem: Risk for Infection, Impaired Wound Healing  Goal: Remain free from signs and symptoms of infection  Outcome: PROGRESSING AS EXPECTED  Patient afebrile upon assessment, no s/sx of infection. Educated on s/sx of infection.

## 2018-03-08 NOTE — PROGRESS NOTES
DATE OF SERVICE:  2018    I have seen and examined the patient who still remains at 5 cm, 80% effaced   and -2 station.  At this time, we have made diagnosis of arrest of dilation   and decided to proceed with delivery by primary  section.  I have   discussed with her risks of the  to include pain, bleeding,   infection, damage to internal organs, anesthetic risks, HIV and hepatitis in   the event that a transfusion is necessary.  The patient's questions have all   been answered and she has signed consent papers.  We are going to therefore   proceed with delivery by primary  section.       ____________________________________     Corinne E. Capurro, MD [facility] / [facility]MIN    DD:  2018 18:13:52  DT:  2018 21:39:47    D#:  [id number]  Job#:  [id number]

## 2018-03-08 NOTE — PROGRESS NOTES
Patient admitted to room S320. Bedside report received from AISLINN Jimenez RN. Assessment complete. POC discussed. Patient will request pain medication as needed. Patient oriented to room and educated on call light, emergency light, and skylight. Baby's clipboard reviewed and instructions given for filling it out. Call light within reach. Will continue to monitor.

## 2018-03-08 NOTE — CARE PLAN
Problem: Altered physiologic condition related to postoperative  delivery  Goal: Patient physiologically stable as evidenced by normal lochia, palpable uterine involution and vital signs within normal limits  Outcome: PROGRESSING AS EXPECTED  Fundus firm, lochia light. Vital signs stable.     Problem: Alteration in comfort related to surgical incision and/or after birth pains  Goal: Patient verbalizes acceptable pain level  Outcome: PROGRESSING AS EXPECTED  Patient will call to request pain medication as needed.

## 2018-03-08 NOTE — DISCHARGE PLANNING
Social work    Postpartum    Visited with MOB. She was very appropriate during conversation and engaged.     Living in the home will be baby, MOB, and FOB. Fathers name is Adalberto Dave. Living address is [address] [place] [address]  [id number] [id number] [id number]. There are no other siblings at home this is her first child.     When I entered the room mother stated she has suffered with anxiety as well as her father. She feels her anxiety is much worse now that baby was born. She stated her mother suffered with postpartum depression. MOB wants to talk to Dr about medication for this. I provided MOB with resources for outpatient counseling as well as helpful resources for new parent classes. Mother will call places listed to set up classes. Faxed over [employer] form as well.     I informed mob to reach out to her Dr for additional resources if post partum becomes a problem she encounters.    MOB stated she has a great support system . She has FOB family who all live here and support them in any way they can. MOBs family will be driving to see her within the next few weeks. Mob stated she has car seat, plenty of clothes, and crib. She feels she has everything she may need.     When talking with mother I stated the importance of engaging with baby and learning the proper care. I stated the nurse will be providing plenty of education to help her feel more comfortable with new baby. Mob and fob verbalized understanding of learning the care and trying to not feel so nervous. Nurse informed them Sw will have to get more involved if they cannot show the proper understanding for care for baby.    Provided Mob with a pcp list. PFA will come and screen baby for medicaid so we cans et up and establish with a PCP.     Discussed with mob the use of marijuana. Informed mob she cannot breastfeed and use marijuana. Mob verbalized understanding and stated she used it as a last resort for nausea.    Mob  and FOB are happy with the care being received.  They do not have any other questions at this time. Informed mob and fob we are here to help her with anything else she may need. Parents verbalized understanding.    Plan: To follow pt to make sure they learn the education as well as set up education.

## 2018-03-08 NOTE — DELIVERY NOTE
DATE OF SERVICE:  2018    PREOPERATIVE DIAGNOSES:  1.  Intrauterine pregnancy at 41 weeks.  2.  Failed induction of labor.  3.  Arrest of dilation.    POSTOPERATIVE DIAGNOSES:  1.  Intrauterine pregnancy at 41 weeks.  2.  Failed induction of labor.  3.  Arrest of dilation.    PROCEDURE:  Primary low transverse cervical  section.    PRIMARY SURGEON:  Corinne Capurro, MD    ASSISTANT:  Jimbo Casper MD    ANESTHESIA:  General endotracheal anesthesia.    COMPLICATIONS:  None.    TOTAL ESTIMATED BLOOD LOSS:  Less than 800 mL.    PATHOLOGY:  None.    FINDINGS:  A viable female infant with Apgars of 6 and 9.  Normal uterus,   tubes, and ovaries were noted bilaterally.    PROCEDURE:  Patient was taken to the operating room.  She was prepped and   draped in the usual sterile fashion.  She was never able to obtain a   comfortable level with her epidural and so general anesthesia was performed.    A Pfannenstiel skin incision was made with the knife.  This incision was   carried down through to the underlying fascia using Bovie cautery.  Fascia was   incised and extended laterally using Davis scissors.  The rectus fascia was   dissected off the underlying rectus muscle using sharp dissection.  Peritoneum   was tented up and entered sharply using blunt fracture.  This incision was   extended superiorly and inferiorly with good visualization of the bladder.    Bladder blade was inserted.  Vesicouterine peritoneum was tented up and   entered sharply using Metzenbaum scissors.  This incision was extended   laterally and the bladder flap was created digitally.  Lower uterine segment   was then incised with the knife.  This incision was extended using finger   fracture.  Infant's head was delivered atraumatically.  Nose and mouth were   bulb suctioned.  Rest of the infant delivered through uncomplicated.  Cord was   clamped x2 and cut and infant was handed over to awaiting nursing staff.    Manual removal of intact  placenta with 3-vessel cord.    The uterus was exteriorized and cleared of all clot and debris.  The lower   uterine segment was repaired in running fashion using 0 Vicryl.  A second   imbricating suture of 0 Vicryl was then performed and hemostasis assured.  The   uterus was returned to the abdomen and the abdominal gutters were cleared of   all clot and debris.  Peritoneum was repaired in a running fashion using 3-0   Vicryl.  Fascia was repaired in a running fashion using 0 Vicryl.    Subcutaneous tissues were copiously irrigated and hemostasis assured.  These   tissues were then reapproximated using 3-0 Vicryl and hemostasis assured.    Skin was then closed with absorbable staples.  The incision was then covered   with benzoin, Steri-Strips, and Tegaderm.  Patient returned to recovery in   stable condition.  Sponge, lap and needle counts were correct x3 at the end of   the procedure.       ____________________________________     Corinne E. Capurro, MD [facility] / [facility]    DD:  03/07/2018 19:48:41  DT:  03/08/2018 03:15:58    D#:  [id number]  Job#:  [id number]

## 2018-03-08 NOTE — PROGRESS NOTES
1830 - Report from AISLINN Turner RN, in OR2 for primary C/S for failure to progress.  1909 - C/S viable female delivered 6/9 APGARS, intact placenta delivered  1945 - In pacu for recovery  2100 - Dr. Osorio updated about bleeded, orders for cytotec.   2106 - Cytotec given 600mcg  2211 - Percocet given (See MAR)  2215 -transferred to postpartum via gurney in stable condition with infant in arms. Report to PREETI Fernandez. POC discussed

## 2018-03-08 NOTE — PROGRESS NOTES
POD#1  S) pt without c/o,  Renner still in place  O) vss  Inc: c/d/i, dressing in place  Ext: no edema  H.4  A/P POD#1, s/p primary c/s for arrest of dilation  Stable, continue orders

## 2018-03-08 NOTE — PROGRESS NOTES
S) pt comfortable with  Epidural  O) vss  VE: 5/80/-2  Fht's: 150's reactive  [place]: q 2min  A/P IUP term   - cervical exam unchanged over 4hrs. Will continue pitocin for 2hrs and if unchanged will most likely proceed with delivery by primary c/s

## 2018-03-09 LAB
ERYTHROCYTE [DISTWIDTH] IN BLOOD BY AUTOMATED COUNT: 47.1 FL (ref 35.9–50)
HCT VFR BLD AUTO: 28.1 % (ref 37–47)
HGB BLD-MCNC: 9 G/DL (ref 12–16)
MCH RBC QN AUTO: 26.7 PG (ref 27–33)
MCHC RBC AUTO-ENTMCNC: 32 G/DL (ref 33.6–35)
MCV RBC AUTO: 83.4 FL (ref 81.4–97.8)
PLATELET # BLD AUTO: 156 K/UL (ref 164–446)
PMV BLD AUTO: 11.5 FL (ref 9–12.9)
RBC # BLD AUTO: 3.37 M/UL (ref 4.2–5.4)
WBC # BLD AUTO: 9.1 K/UL (ref 4.8–10.8)

## 2018-03-09 PROCEDURE — 700111 HCHG RX REV CODE 636 W/ 250 OVERRIDE (IP): Performed by: OBSTETRICS & GYNECOLOGY

## 2018-03-09 PROCEDURE — 700112 HCHG RX REV CODE 229: Performed by: OBSTETRICS & GYNECOLOGY

## 2018-03-09 PROCEDURE — 36415 COLL VENOUS BLD VENIPUNCTURE: CPT

## 2018-03-09 PROCEDURE — 700102 HCHG RX REV CODE 250 W/ 637 OVERRIDE(OP): Performed by: OBSTETRICS & GYNECOLOGY

## 2018-03-09 PROCEDURE — 85027 COMPLETE CBC AUTOMATED: CPT

## 2018-03-09 PROCEDURE — A9270 NON-COVERED ITEM OR SERVICE: HCPCS | Performed by: OBSTETRICS & GYNECOLOGY

## 2018-03-09 PROCEDURE — 770002 HCHG ROOM/CARE - OB PRIVATE (112)

## 2018-03-09 RX ADMIN — OXYCODONE HYDROCHLORIDE AND ACETAMINOPHEN 1 TABLET: 10; 325 TABLET ORAL at 15:33

## 2018-03-09 RX ADMIN — IBUPROFEN 600 MG: 600 TABLET, FILM COATED ORAL at 11:06

## 2018-03-09 RX ADMIN — SIMETHICONE CHEW TAB 80 MG 80 MG: 80 TABLET ORAL at 05:53

## 2018-03-09 RX ADMIN — ONDANSETRON 4 MG: 4 TABLET, ORALLY DISINTEGRATING ORAL at 17:08

## 2018-03-09 RX ADMIN — ONDANSETRON 4 MG: 4 TABLET, ORALLY DISINTEGRATING ORAL at 23:56

## 2018-03-09 RX ADMIN — IBUPROFEN 600 MG: 600 TABLET, FILM COATED ORAL at 23:29

## 2018-03-09 RX ADMIN — OXYCODONE HYDROCHLORIDE AND ACETAMINOPHEN 1 TABLET: 5; 325 TABLET ORAL at 11:06

## 2018-03-09 RX ADMIN — IBUPROFEN 600 MG: 600 TABLET, FILM COATED ORAL at 17:06

## 2018-03-09 RX ADMIN — SIMETHICONE CHEW TAB 80 MG 80 MG: 80 TABLET ORAL at 22:05

## 2018-03-09 RX ADMIN — DOCUSATE SODIUM 100 MG: 100 CAPSULE ORAL at 05:53

## 2018-03-09 RX ADMIN — IBUPROFEN 600 MG: 600 TABLET, FILM COATED ORAL at 04:27

## 2018-03-09 RX ADMIN — OXYCODONE HYDROCHLORIDE AND ACETAMINOPHEN 1 TABLET: 10; 325 TABLET ORAL at 23:29

## 2018-03-09 RX ADMIN — OXYCODONE HYDROCHLORIDE AND ACETAMINOPHEN 1 TABLET: 5; 325 TABLET ORAL at 02:58

## 2018-03-09 ASSESSMENT — PAIN SCALES - GENERAL
PAINLEVEL_OUTOF10: 4
PAINLEVEL_OUTOF10: 3
PAINLEVEL_OUTOF10: 6
PAINLEVEL_OUTOF10: 2
PAINLEVEL_OUTOF10: 4

## 2018-03-09 NOTE — CARE PLAN
Problem: Potential for postpartum infection related to surgical incision, compromised uterine condition, urinary tract or respiratory compromise  Goal: Patient will be afebrile and free from signs and symptoms of infection  Outcome: PROGRESSING AS EXPECTED  Patient shows no s/s of infection.  Will continue to monitor with hourly rounding.    Problem: Potential anxiety related to difficulty adapting to parental role  Goal: Patient will verbalize and demonstrate effective bonding and parenting behavior  Outcome: PROGRESSING AS EXPECTED  Patient is bonding well with baby, recognizing cues, and responding appropriately.

## 2018-03-09 NOTE — CONSULTS
"MOB requesting assist with latch.  Infant sleeping.  Demonstrated to MOB on how to wake sleepy infant via undressing infant down to diaper and touch.  Infant awake and rooting.  Demonstrated to MOB on how to wedge breast to obtain a deeper latch.  Latch successful.  MOB denies pain with latch.  No tissue breakdown noted at breasts.    Encouraged MOB to put infant to breasts to feed on demand per feeding cues and at least 8-12 times in a 24 hour period.  Advised her not to let infant go more than 3 hours without a feed.    Discussed normal course of breastfeeding for the first 24-48-72 hours following delivery as well as nipple care.    \"A New Beginning\" booklet provided and content reviewed.      Assisted MOB with burping of infant.  MOB demonstrated willingness to meet infant's needs and showed interest in learning how to care for infant.    MOB has [place] insurance.  Will be obtaining Medicaid insurance for infant.  MOB also has WIC and is aware of the outpatient lactation assistance available to her through [place] and the [facility].  MOB invited to attend breastfeeding support group through the [facility].    MOB verbalized understanding of all information provided to her.  Denies having any further questions at this time.  Encouraged to call for assistance as needed.  "

## 2018-03-09 NOTE — CARE PLAN
Problem: Altered physiologic condition related to postoperative  delivery  Goal: Patient physiologically stable as evidenced by normal lochia, palpable uterine involution and vital signs within normal limits    Intervention: Massage fundus as necessary to prevent excessive lochia  Lochia light. Fundus firm.       Problem: Alteration in comfort related to surgical incision and/or after birth pains  Goal: Patient is able to ambulate, care for self and infant with acceptable pain level    Intervention: Obtain patient's pain goal  Keep comfortable at rest and with movement. Will assess pain level post administration.

## 2018-03-09 NOTE — PROGRESS NOTES
Pt states the infant is latching well. Infant latched right on without difficulties. Colostrum easily expressed and pt taught how to hand express. Outpatient resources provided. Mother encouraged to follow up if needed.

## 2018-03-09 NOTE — PROGRESS NOTES
POD#2  S) pt without c/o, urinating, walking  O) vss   Inc: c/d/i, internal staples  Ext: no edema  A/P POD#2, s/p primary c/s for arrest of dilation  Stable, continue orders

## 2018-03-09 NOTE — PROGRESS NOTES
1910: Bedside report received. Assumed pt. Care. Pt resting in bed. AAO x4.  No s/s of distress noted. Pt to be medicated for pain upon request. Call light and belongings within reach. Bed in the lowest position. Treaded socks in place. Will continue to monitor. Assessment done and WNL.   Fundus firm. Lochia light.    2025: Pt showered and took off dressing. Steri-strips intact. No s/s of infection noted.

## 2018-03-09 NOTE — PROGRESS NOTES
Renner removed, patient tolerated well.  Ambulates well with steady gait.  Patient has until 1930 to void.  Patient will call when she needs to go to the restroom.

## 2018-03-09 NOTE — DISCHARGE PLANNING
:    Received fax confirmation on the [email or web address] referral and infant's UDS was negative.    Plan:  Infant is cleared to discharge home with MOB once medically ready.

## 2018-03-09 NOTE — PROGRESS NOTES
Pt assessment complete, wnl. Fundus firm at umbilicus with minimal discharge.  Pt ambulating and voiding without difficulty. Bonding well with infant, assisted pt with breast feeding. Plan of care discussed with patient for the day. Questions answered, will continue to educate patient and FOB on basic care for baby. Encouraged to call with needs, will monitor.

## 2018-03-10 PROCEDURE — 700102 HCHG RX REV CODE 250 W/ 637 OVERRIDE(OP): Performed by: OBSTETRICS & GYNECOLOGY

## 2018-03-10 PROCEDURE — 700112 HCHG RX REV CODE 229: Performed by: OBSTETRICS & GYNECOLOGY

## 2018-03-10 PROCEDURE — A9270 NON-COVERED ITEM OR SERVICE: HCPCS | Performed by: OBSTETRICS & GYNECOLOGY

## 2018-03-10 PROCEDURE — 770002 HCHG ROOM/CARE - OB PRIVATE (112)

## 2018-03-10 RX ADMIN — IBUPROFEN 600 MG: 600 TABLET, FILM COATED ORAL at 12:00

## 2018-03-10 RX ADMIN — OXYCODONE HYDROCHLORIDE AND ACETAMINOPHEN 1 TABLET: 5; 325 TABLET ORAL at 17:52

## 2018-03-10 RX ADMIN — DOCUSATE SODIUM 100 MG: 100 CAPSULE ORAL at 12:00

## 2018-03-10 RX ADMIN — SIMETHICONE CHEW TAB 80 MG 80 MG: 80 TABLET ORAL at 12:00

## 2018-03-10 RX ADMIN — OXYCODONE HYDROCHLORIDE AND ACETAMINOPHEN 1 TABLET: 5; 325 TABLET ORAL at 12:00

## 2018-03-10 RX ADMIN — IBUPROFEN 600 MG: 600 TABLET, FILM COATED ORAL at 19:41

## 2018-03-10 ASSESSMENT — PAIN SCALES - GENERAL
PAINLEVEL_OUTOF10: 1
PAINLEVEL_OUTOF10: 2
PAINLEVEL_OUTOF10: 0
PAINLEVEL_OUTOF10: 5
PAINLEVEL_OUTOF10: 6
PAINLEVEL_OUTOF10: 5
PAINLEVEL_OUTOF10: 1

## 2018-03-10 NOTE — CARE PLAN
Problem: Knowledge Deficit  Goal: Knowledge of disease process/condition, treatment plan, diagnostic tests, and medications will improve  Outcome: PROGRESSING AS EXPECTED  Pt has had postpartum education and is caring for self and infant demonstrating knowledge.

## 2018-03-10 NOTE — CARE PLAN
Problem: Safety  Goal: Will remain free from injury  Outcome: PROGRESSING AS EXPECTED  Pt is able to ambulate and care for self and infant-calls for assist when needed.

## 2018-03-10 NOTE — PROGRESS NOTES
Hospital Day : 4    S: better pain control; breast feeding; ambulating    O:  Vitals:    03/08/18 1200 03/08/18 2000 03/09/18 0800 03/09/18 2000   BP: 101/65 107/55 123/84 118/79   Pulse: (!) 108 85 93 88   Resp: 18 17 20 16   Temp: 36.7 °C (98 °F) 36.7 °C (98.1 °F) 37.1 °C (98.7 °F) 36.7 °C (98 °F)   TempSrc:       SpO2: 98% 95% 95% 96%   Weight:       Height:           Recent Labs      03/08/18   0347  03/09/18   0426   WBC  15.3*  9.1   RBC  3.64*  3.37*   HEMOGLOBIN  9.4*  9.0*   HEMATOCRIT  28.9*  28.1*   MCV  79.4*  83.4   MCH  25.8*  26.7*   MCHC  32.5*  32.0*   RDW  43.5  47.1   PLATELETCT  250  156*   MPV  11.0  11.5             abd soft ff; cdi    A: pod 3 sp 1ltcs for arrest dil ;would like to stay an add day    P: cpm

## 2018-03-10 NOTE — PROGRESS NOTES
Pt appears comfortable, denies pain at this time-breastfeeding infant without much difficulty-pt encouraged to ask for assistance if needed-pt states she wants to stay until tomorrow-[facility] pediatrician notified.

## 2018-03-10 NOTE — CONSULTS
"Mom feels breasts are filling, using ice on the breast for comfort.  Nipples tender, no open damage but slight redness.  States they come out of the baby's mouth like a tube of new lipstick.  Mom states she doesn't want the wake the baby so the baby can sleep.  Mom exhausted, states she has  post op pain, uterine contractions with breastfeeding and difficulty latching across the chest.    Discussed with mom proper latch for depth to prevent sore nipples.  She had \"just fed\" an hour ago and infant asleep.  Reviewed waking a sleepy baby for breastfeeding, encouraged skin to skin.  Discussed normality of uterine contractions with breastfeeding and positioning in football to have more control for a deeper hold.  She will call RN for assistance.   "

## 2018-03-11 VITALS
DIASTOLIC BLOOD PRESSURE: 61 MMHG | OXYGEN SATURATION: 97 % | BODY MASS INDEX: 34.16 KG/M2 | HEART RATE: 92 BPM | RESPIRATION RATE: 18 BRPM | TEMPERATURE: 97.5 F | HEIGHT: 65 IN | WEIGHT: 205 LBS | SYSTOLIC BLOOD PRESSURE: 119 MMHG

## 2018-03-11 PROCEDURE — A9270 NON-COVERED ITEM OR SERVICE: HCPCS | Performed by: OBSTETRICS & GYNECOLOGY

## 2018-03-11 PROCEDURE — 700102 HCHG RX REV CODE 250 W/ 637 OVERRIDE(OP): Performed by: OBSTETRICS & GYNECOLOGY

## 2018-03-11 PROCEDURE — 700112 HCHG RX REV CODE 229: Performed by: OBSTETRICS & GYNECOLOGY

## 2018-03-11 RX ORDER — IBUPROFEN 600 MG/1
600 TABLET ORAL EVERY 6 HOURS PRN
Qty: 30 TAB | Refills: 1 | Status: SHIPPED | OUTPATIENT
Start: 2018-03-11 | End: 2021-08-25

## 2018-03-11 RX ORDER — OXYCODONE HYDROCHLORIDE AND ACETAMINOPHEN 5; 325 MG/1; MG/1
1-2 TABLET ORAL EVERY 6 HOURS PRN
Qty: 25 TAB | Refills: 0 | Status: SHIPPED | OUTPATIENT
Start: 2018-03-11 | End: 2018-03-18

## 2018-03-11 RX ORDER — LANOLIN ALCOHOL/MO/W.PET/CERES
325 CREAM (GRAM) TOPICAL
Qty: 30 TAB | Refills: 0 | Status: SHIPPED | OUTPATIENT
Start: 2018-03-11 | End: 2021-08-25

## 2018-03-11 RX ORDER — PSEUDOEPHEDRINE HCL 30 MG
100 TABLET ORAL 2 TIMES DAILY PRN
Qty: 60 CAP | Refills: 1 | Status: SHIPPED | OUTPATIENT
Start: 2018-03-11 | End: 2021-08-25

## 2018-03-11 RX ADMIN — SIMETHICONE CHEW TAB 80 MG 80 MG: 80 TABLET ORAL at 07:49

## 2018-03-11 RX ADMIN — DOCUSATE SODIUM 100 MG: 100 CAPSULE ORAL at 07:49

## 2018-03-11 RX ADMIN — IBUPROFEN 600 MG: 600 TABLET, FILM COATED ORAL at 07:49

## 2018-03-11 RX ADMIN — OXYCODONE HYDROCHLORIDE AND ACETAMINOPHEN 1 TABLET: 5; 325 TABLET ORAL at 07:49

## 2018-03-11 RX ADMIN — OXYCODONE HYDROCHLORIDE AND ACETAMINOPHEN 1 TABLET: 5; 325 TABLET ORAL at 00:15

## 2018-03-11 ASSESSMENT — PAIN SCALES - GENERAL
PAINLEVEL_OUTOF10: 5
PAINLEVEL_OUTOF10: 3
PAINLEVEL_OUTOF10: 7

## 2018-03-11 NOTE — DISCHARGE INSTRUCTIONS
POSTPARTUM DISCHARGE INSTRUCTIONS FOR MOM    YOB: 1995   Age: 22 y.o.               Admit Date: 3/6/2018     Discharge Date: 3/11/2018  Attending Doctor:  Corinne E Capurro, M.D.                  Allergies:  Patient has no known allergies.    Discharged to home by car. Discharged via wheelchair, hospital escort: Yes.  Special equipment needed: Not Applicable  Belongings with: Personal  Be sure to schedule a follow-up appointment with your primary care doctor or any specialists as instructed.     Discharge Plan:   Diet Plan: Discussed  Activity Level: Discussed  Smoking Cessation Offered: Patient Counseled  Confirmed Follow up Appointment: Patient to Call and Schedule Appointment  Confirmed Symptoms Management: Discussed  Medication Reconciliation Updated: Yes  Influenza Vaccine Indication: Patient Refuses    REASONS TO CALL YOUR OBSTETRICIAN:  1.   Persistent fever or shaking chills (Temperature higher than 100.4)  2.   Heavy bleeding (soaking more than 1 pad per hour); Passing clots  3.   Foul odor from vagina  4.   Mastitis (Breast infection; breast pain, chills, fever, redness)  5.   Urinary pain, burning or frequency  6.   Episiotomy infection  7.   Abdominal incision infection  8.   Severe depression longer than 24 hours    HAND WASHING  · Prior to handling the baby.  · Before breastfeeding or bottle feeding baby.  · After using the bathroom or changing the baby's diaper.    WOUND CARE  Ask your physician for additional care instructions.  In general:    ·  Incision:      · Keep clean and dry.    · Do NOT lift anything heavier than your baby for up to 6 weeks.    · There should not be any opening or pus.      VAGINAL CARE  · Nothing inside vagina for 6 weeks: no sexual intercourse, tampons or douching.  · Bleeding may continue for 2-4 weeks.  Amount may vary.    · Call your physician for heavy bleeding which means soaking more than 1 pad per hour    BIRTH CONTROL  · It is possible to become  "pregnant at any time after delivery and while breastfeeding.  · Plan to discuss a method of birth control with your physician at your follow up visit. visit.    DIET AND ELIMINATION  · Eating more fiber (bran cereal, fruits, and vegetables) and drinking plenty of fluids will help to avoid constipation.  · Urinary frequency after childbirth is normal.    POSTPARTUM BLUES  During the first few days after birth, you may experience a sense of the \"blues\" which may include impatience, irritability or even crying.  These feeling come and go quickly.  However, as many as 1 in 10 women experience emotional symptoms known as postpartum depression.    Postpartum depression:  May start as early as the second or third day after delivery or take several weeks or months to develop.  Symptoms of \"blues\" are present, but are more intense:  Crying spells; loss of appetite; feelings of hopelessness or loss of control; fear of touching the baby; over concern or no concern at all about the baby; little or no concern about your own appearance/caring for yourself; and/or inability to sleep or excessive sleeping.  Contact your physician if you are experiencing any of these symptoms.    Crisis Hotline:  · [place] Crisis Hotline:  6-700-QPXHCIH  Or 1-968.772.4449  · [place] Crisis Hotline:  1-136.114.7324  Or 963-070-3088    PREVENTING SHAKEN BABY:  If you are angry or stressed, PUT THE BABY IN THE CRIB, step away, take some deep breaths, and wait until you are calm to care for the baby.  DO NOT SHAKE THE BABY.  You are not alone, call a supporter for help.    · Crisis [facility] Center 24/7 crisis line 601-470-6870 or 1-579.961.9886  · You can also text them, text \"ANSWER\" to 684244    QUIT SMOKING/TOBACCO USE:  I understand the use of any tobacco products increases my chance of suffering from future heart disease and could cause other illnesses which may shorten my life. Quitting the use of tobacco products is the single most important thing I " can do to improve my health. For further information on smoking / tobacco cessation call a Toll Free Quit Line at 1-845.279.3934 (*National Cancer [place]) or 1-257.628.6036 (American Lung Association) or you can access the web based program at www.lungusa.org.    · [place] Tobacco Users Help Line:  (258) 845-5058       Toll Free: 1-810.906.4898  · Quit Tobacco Program [facility] (655)409-2397    DEPRESSION / SUICIDE RISK:  As you are discharged from this [facility], it is important to learn how to keep safe from harming yourself.    Recognize the warning signs:  · Abrupt changes in personality, positive or negative- including increase in energy   · Giving away possessions  · Change in eating patterns- significant weight changes-  positive or negative  · Change in sleeping patterns- unable to sleep or sleeping all the time   · Unwillingness or inability to communicate  · Depression  · Unusual sadness, discouragement and loneliness  · Talk of wanting to die  · Neglect of personal appearance   · Rebelliousness- reckless behavior  · Withdrawal from people/activities they love  · Confusion- inability to concentrate     If you or a loved one observes any of these behaviors or has concerns about self-harm, here's what you can do:  · Talk about it- your feelings and reasons for harming yourself  · Remove any means that you might use to hurt yourself (examples: pills, rope, extension cords, firearm)  · Get professional help from the community (Mental Health, Substance Abuse, psychological counseling)  · Do not be alone:Call your Safe Contact- someone whom you trust who will be there for you.  · Call your local CRISIS HOTLINE 160-6206 or 421-841-4004  · Call your local Children's Mobile Crisis Response Team [place] (812) 656-4833 or www.[employer]  · Call the toll free National Suicide Prevention Hotlines   · National Suicide Prevention Lifeline 433-244-SCTC (3810)  · National  [facility] 800-SUICIDE (856-4269)    DISCHARGE SURVEY:  Thank you for choosing [facility].  We hope we provided you with very good care.  You may be receiving a survey in the mail.  Please fill it out.  Your opinion is valuable to us.    ADDITIONAL EDUCATIONAL MATERIALS GIVEN TO PATIENT:        My signature on this form indicates that:  1.  I have reviewed and understand the above information  2.  My questions regarding this information have been answered to my satisfaction.  3.  I have formulated a plan with my discharge nurse to obtain my prescribed medication for home.

## 2018-03-11 NOTE — PROGRESS NOTES
Reviewed discharge paperwork with parents, parents verbalized understanding of follow up appointments and discharge education. Escorted out by Candace BURTON, accompanied by infant and FOB.

## 2018-03-11 NOTE — CARE PLAN
Problem: Infection  Goal: Will remain free from infection  No signs of infection present at this time, no signs of infection of the incision    Problem: Knowledge Deficit  Goal: Knowledge of disease process/condition, treatment plan, diagnostic tests, and medications will improve  Patient very anxious, pt reassured and education reinforced

## 2018-03-11 NOTE — PROGRESS NOTES
No c/o, Decreased vaginal bleeding, pain controlled, wants d/c  VSS AF  PE:  Abd apprpo mild TTP, no peritoneal signs, wound= C/D/I, no s/s erythema          Ext: No s/s DVT            Lab= Hb =8.5      A: S/p c/s pod number 4, doing well         P:  Plan d/c home  Follow up immediately office or if closed emergency room with fever >100 degrees, severe pain, intractable nausea or vomiting, syncope or dizziness, vaginal bleeding greater than a period, problem with wound, any concerns.  Pelvic rest for 6 weeks, no driving on narcotic pain medication, no lifting greater than 10 pounds.

## 2018-03-12 NOTE — DISCHARGE SUMMARY
DATE OF ADMISSION:  2018    DATE OF DISCHARGE:  2018    ADMISSION DIAGNOSES:  1.  Intrauterine pregnancy at postdates.  2.  Induction for postdates.    DISCHARGE DIAGNOSIS:  1.  Status post primary low transverse , postoperative day #4   secondary to failure to dilate.  2.  Status post Cytotec and Pitocin failed induction.  3.  Anemia.    HOSPITAL COURSE:  Patient was admitted for induction for post term, this did   not occur as planned.  She underwent a primary low transverse  for   failure to dilate without complications.  Her postpartum course was   complicated by hemoglobin of 9, but otherwise on the day of discharge, she was   tolerating a regular diet, ambulating without difficulty.  Vital signs are   stable, within normal limits.  Physical exam was within normal limits.  She   desired discharge home.    DISCHARGE MEDICATIONS:  Motrin, Percocet, iron, and Colace.    DISCHARGE INSTRUCTIONS:  The patient was counseled on the risks of narcotic   medication and an assessment was undertaken with her and she is not at risk   for abuse.    DISCHARGE INSTRUCTIONS:  The patient to follow up with fever greater or equal   to 100 degrees, severe abdominal pain, intractable nausea, vomiting, syncope,   dizziness, vaginal bleeding greater than period, problem with incision, signs   or symptoms of preeclampsia or any other concerns.  She will have pelvic rest   for 6 weeks.  No driving for 5 days or while on narcotic medication.  Follow   up appointment in 1 week for incision check as well.       ____________________________________     MD SAEED SOTO / NTS    DD:  2018 10:13:48  DT:  2018 19:05:58    D#:  [id number]  Job#:  [id number]    cc: [facility]

## 2018-03-18 NOTE — OP REPORT
DATE OF SERVICE:  2018    PREOPERATIVE DIAGNOSES:  1.  Intrauterine pregnancy at 41 weeks.  2.  Failed induction of labor.  3.  Arrest of dilation.    POSTOPERATIVE DIAGNOSES:  1.  Intrauterine pregnancy at 41 weeks.  2.  Failed induction of labor.  3.  Arrest of dilation.    PROCEDURE:  Primary low transverse cervical  section.    PRIMARY SURGEON:  Corinne Capurro, MD    ASSISTANT:  Jimbo Casper MD    ANESTHESIA:  General endotracheal anesthesia.    COMPLICATIONS:  None.    TOTAL ESTIMATED BLOOD LOSS:  Less than 800 mL.    PATHOLOGY:  None.    FINDINGS:  A viable female infant with Apgars of 6 and 9.  Normal uterus,   tubes, and ovaries were noted bilaterally.    PROCEDURE:  Patient was taken to the operating room.  She was prepped and   draped in the usual sterile fashion.  She was never able to obtain a   comfortable level with her epidural, and so general anesthesia was performed.    A Pfannenstiel skin incision was made with the knife.  This incision was   carried down through to the underlying fascia using Bovie cautery.  Fascia was   incised and extended laterally using Davis scissors.  The rectus fascia was   dissected off the underlying rectus muscle using sharp dissection.  Peritoneum   was tented up and entered sharply using blunt fracture.  This incision was   extended superiorly and inferiorly with good visualization of the bladder.    Bladder blade was inserted.  Vesicouterine peritoneum was tented up and   entered sharply using Metzenbaum scissors.  This incision was extended   laterally, and the bladder flap was created digitally.  Lower uterine segment   was then incised with the knife.  This incision was extended using finger   fracture.  Infant's head was delivered atraumatically.  Nose and mouth were   bulb suctioned.  Rest of the infant delivered through uncomplicated.  Cord was   clamped x2 and cut and infant was handed over to awaiting nursing staff.    Manual removal of intact  placenta with 3-vessel cord.    The uterus was exteriorized and cleared of all clot and debris.  The lower   uterine segment was repaired in running fashion using 0 Vicryl.  A second   imbricating suture of 0 Vicryl was then performed and hemostasis assured.  The   uterus was returned to the abdomen, and the abdominal gutters were cleared of   all clot and debris.  Peritoneum was repaired in a running fashion using 3-0   Vicryl.  Fascia was repaired in a running fashion using 0 Vicryl.    Subcutaneous tissues were copiously irrigated and hemostasis assured.  These   tissues were then reapproximated using 3-0 Vicryl and hemostasis assured.    Skin was then closed with absorbable staples.  The incision was then covered   with benzoin, Steri-Strips, and Tegaderm.  Patient returned to recovery in   stable condition.  Sponge, lap and needle counts were correct x3 at the end of   the procedure.       ____________________________________     Corinne E. Capurro, MD [facility] / [facility]    DD:  03/07/2018 19:48:41  DT:  03/08/2018 03:15:58    D#:  [id number]  Job#:  [id number]

## 2021-01-13 ENCOUNTER — TELEMEDICINE (OUTPATIENT)
Dept: TELEHEALTH | Facility: TELEMEDICINE | Age: 26
End: 2021-01-13
Payer: COMMERCIAL

## 2021-01-13 DIAGNOSIS — J45.990 EXERCISE-INDUCED ASTHMA: ICD-10-CM

## 2021-01-13 PROCEDURE — 99203 OFFICE O/P NEW LOW 30 MIN: CPT | Mod: 95,CR | Performed by: PHYSICIAN ASSISTANT

## 2021-01-13 RX ORDER — ALBUTEROL SULFATE 90 UG/1
2 AEROSOL, METERED RESPIRATORY (INHALATION) EVERY 6 HOURS PRN
Qty: 8.5 G | Refills: 2 | Status: SHIPPED | OUTPATIENT
Start: 2021-01-13 | End: 2021-08-24 | Stop reason: SDUPTHER

## 2021-01-13 ASSESSMENT — ENCOUNTER SYMPTOMS
GASTROINTESTINAL NEGATIVE: 1
CHEST TIGHTNESS: 1
CONSTITUTIONAL NEGATIVE: 1
WHEEZING: 1
CARDIOVASCULAR NEGATIVE: 1
SHORTNESS OF BREATH: 1
COUGH: 0

## 2021-01-14 NOTE — PROGRESS NOTES
Subjective:      Carrie Adame is a 25 y.o. female who presents with Asthma (Exercise-induced.  Needs refill of albuterol)    This evaluation was conducted via Zoom using secure and encrypted videoconferencing technology. The patient was in a private location in the state of [place].    The patient's identity was confirmed and verbal consent was obtained for this virtual visit.               Exercise-induced asthma since childhood.  Patient has started exercising again and is out of her inhaler.  She has no active symptoms.  No concerns today.    Asthma  She complains of chest tightness, shortness of breath and wheezing. There is no cough. This is a chronic problem. The current episode started more than 1 year ago. The problem occurs rarely. The problem has been waxing and waning. Her symptoms are aggravated by exercise. Her symptoms are alleviated by beta-agonist. She reports complete improvement on treatment. Her past medical history is significant for asthma.       PMH:  has no past medical history on file.  MEDS:   Current Outpatient Medications:   •  albuterol 108 (90 Base) MCG/ACT Aero Soln inhalation aerosol, Inhale 2 Puffs every 6 hours as needed for Shortness of Breath., Disp: 8.5 g, Rfl: 2  •  ibuprofen (MOTRIN) 600 MG Tab, Take 1 Tab by mouth every 6 hours as needed (For cramping after delivery; do not give if patient is receiving ketorolac (Toradol))., Disp: 30 Tab, Rfl: 1  •  docusate sodium 100 MG Cap, Take 100 mg by mouth 2 times a day as needed for Constipation., Disp: 60 Cap, Rfl: 1  •  ferrous sulfate 325 (65 Fe) MG EC tablet, Take 1 Tab by mouth 3 times a day, with meals., Disp: 30 Tab, Rfl: 0  •  Prenatal Vit-Fe Fumarate-FA (PRENATAL VITAMIN PO), Take  by mouth., Disp: , Rfl:   ALLERGIES: No Known Allergies  SURGHX:   Past Surgical History:   Procedure Laterality Date   • PRIMARY C SECTION  3/7/2018    Procedure: PRIMARY C SECTION;  Surgeon: Corinne E Capurro, M.D.;  Location: [facility];  Service: Labor and Delivery     SOCHX:  reports that she quit smoking about 3 years ago. She has never used smokeless tobacco.  FH: family history is not on file.    Review of Systems   Constitutional: Negative.    Respiratory: Positive for shortness of breath and wheezing. Negative for cough.    Cardiovascular: Negative.    Gastrointestinal: Negative.        Medications, Allergies, and current problem list reviewed today in Epic     Objective:     There were no vitals taken for this visit.     Physical Exam  Vitals signs and nursing note reviewed.   Constitutional:       General: She is not in acute distress.     Appearance: Normal appearance. She is well-developed. She is not ill-appearing or toxic-appearing.   HENT:      Head: Normocephalic and atraumatic.      Right Ear: External ear normal.      Left Ear: External ear normal.      Nose: Nose normal.   Eyes:      Conjunctiva/sclera: Conjunctivae normal.   Pulmonary:      Effort: Pulmonary effort is normal.   Neurological:      Mental Status: She is alert and oriented to person, place, and time.   Psychiatric:         Mood and Affect: Mood normal.         Behavior: Behavior normal.         Thought Content: Thought content normal.         Judgment: Judgment normal.                 Assessment/Plan:         1. Exercise-induced asthma  albuterol 108 (90 Base) MCG/ACT Aero Soln inhalation aerosol     No active symptoms.  Should anything worsen or change she will be seen in person in clinic.    Return to clinic or go to ED if symptoms worsen or persist. Indications for ED discussed at length. Patient/Parent/Guardian voices understanding. Follow-up with your primary care provider in 3-5 days. Red flag symptoms discussed. All side effects of medication discussed including allergic response, GI upset, tendon injury, rash, sedation etc.    Please note that this dictation was created using voice recognition software. I have made every reasonable attempt to  correct obvious errors, but I expect that there are errors of grammar and possibly content that I did not discover before finalizing the note.

## 2021-08-24 ENCOUNTER — TELEMEDICINE (OUTPATIENT)
Dept: TELEHEALTH | Facility: TELEMEDICINE | Age: 26
End: 2021-08-24
Payer: COMMERCIAL

## 2021-08-24 DIAGNOSIS — J45.20 MILD INTERMITTENT ASTHMA, UNSPECIFIED WHETHER COMPLICATED: ICD-10-CM

## 2021-08-24 PROCEDURE — 99214 OFFICE O/P EST MOD 30 MIN: CPT | Mod: 95,CR | Performed by: PHYSICIAN ASSISTANT

## 2021-08-24 RX ORDER — ALBUTEROL SULFATE 90 UG/1
2 AEROSOL, METERED RESPIRATORY (INHALATION) EVERY 6 HOURS PRN
Qty: 8.5 G | Refills: 2 | Status: SHIPPED | OUTPATIENT
Start: 2021-08-24

## 2021-08-24 RX ORDER — ALBUTEROL SULFATE 2.5 MG/3ML
2.5 SOLUTION RESPIRATORY (INHALATION) EVERY 6 HOURS PRN
Qty: 90 ML | Refills: 0 | Status: SHIPPED | OUTPATIENT
Start: 2021-08-24

## 2021-08-24 NOTE — LETTER
August 25, 2021         Patient: Carrei Adame   YOB: 1995   Date of Visit: 8/24/2021           To Whom it May Concern:    Carrie Adame was seen in my clinic on 8/24/2021. Norris Adame should be excused from work from 8/24/2021-8/26/2021 due to medical reasons.    If you have any questions or concerns, please don't hesitate to call.        Sincerely,           Cynthia Jeffers P.A.-C.  Electronically Signed

## 2021-08-25 ENCOUNTER — PATIENT MESSAGE (OUTPATIENT)
Dept: URGENT CARE | Facility: PHYSICIAN GROUP | Age: 26
End: 2021-08-25

## 2021-08-25 RX ORDER — DEXTROAMPHETAMINE SACCHARATE, AMPHETAMINE ASPARTATE, DEXTROAMPHETAMINE SULFATE AND AMPHETAMINE SULFATE 5; 5; 5; 5 MG/1; MG/1; MG/1; MG/1
TABLET ORAL
COMMUNITY

## 2021-08-25 RX ORDER — TRAZODONE HYDROCHLORIDE 50 MG/1
TABLET ORAL
COMMUNITY
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
## 2022-09-17 NOTE — PROGRESS NOTES
Explained to MOB the infant had a urine collection bag in the diaper due to maternal history of THC use. MOB explained that she used marijuana toward the end of her pregnancy due to nausea and lack of appetite. MOB asked this RN if we would be taking her baby away. Explained that the  would be coming to speak to her regarding THC use and to offer her any resources needed.    Statement Selected

## (undated) DEVICE — DETERGENT RENUZYME PLUS 10 OZ PACKET (50/BX)

## (undated) DEVICE — TUBING CLEARLINK DUO-VENT - C-FLO (48EA/CA)

## (undated) DEVICE — PACK C-SECTION (2EA/CA)

## (undated) DEVICE — SET EXTENSION WITH 2 PORTS (48EA/CA) ***PART #2C8610 IS A SUBSTITUTE*****

## (undated) DEVICE — SLEEVE, SEQUENTIAL CALF REG

## (undated) DEVICE — HEAD HOLDER JUNIOR/ADULT

## (undated) DEVICE — SUTURE 0 VICRYL PLUS CTX - 36 INCH (36/BX)

## (undated) DEVICE — CATHETER IV NON-SAFETY 18 GA X 1 1/4 (50/BX 4BX/CA)

## (undated) DEVICE — CANISTER SUCTION 3000ML MECHANICAL FILTER AUTO SHUTOFF MEDI-VAC NONSTERILE LF DISP  (40EA/CA)

## (undated) DEVICE — TRAY SPINAL ANESTHESIA NON-SAFETY (10/CA)

## (undated) DEVICE — TAPE CLOTH MEDIPORE 6 INCH - (12RL/CA)

## (undated) DEVICE — ELECTRODE DUAL RETURN W/ CORD - (50/PK)

## (undated) DEVICE — KIT  I.V. START (100EA/CA)

## (undated) DEVICE — SODIUM CHL IRRIGATION 0.9% 1000ML (12EA/CA)

## (undated) DEVICE — STAPLER SKIN DISP - (6/BX 10BX/CA) VISISTAT

## (undated) DEVICE — TRAY BLADDER CARE W/ 16 FR FOLEY CATHETER STATLOCK  (10/CA)

## (undated) DEVICE — SUTURE 3-0 VICRYL PLUS CT-1 - 36 INCH (36/BX)

## (undated) DEVICE — BLANKET UNDERBODY FULL ACCES - (5/CA)

## (undated) DEVICE — CHLORAPREP 26 ML APPLICATOR - ORANGE TINT(25/CA)

## (undated) DEVICE — GLOVE BIOGEL SZ 6 PF LATEX - (50EA/BX 4BX/CA)

## (undated) DEVICE — SHEATH RO 4F 10CM (10EA/BX)

## (undated) DEVICE — WATER IRRIG. STER. 1500 ML - (9/CA)